# Patient Record
Sex: FEMALE | Race: WHITE | NOT HISPANIC OR LATINO | Employment: UNEMPLOYED | URBAN - NONMETROPOLITAN AREA
[De-identification: names, ages, dates, MRNs, and addresses within clinical notes are randomized per-mention and may not be internally consistent; named-entity substitution may affect disease eponyms.]

---

## 2023-10-15 ENCOUNTER — HOSPITAL ENCOUNTER (EMERGENCY)
Facility: HOSPITAL | Age: 73
Discharge: OTHER INSTITUTION | End: 2023-10-15
Attending: EMERGENCY MEDICINE
Payer: MEDICARE

## 2023-10-15 ENCOUNTER — APPOINTMENT (OUTPATIENT)
Dept: RADIOLOGY | Facility: HOSPITAL | Age: 73
End: 2023-10-15
Payer: MEDICARE

## 2023-10-15 VITALS
BODY MASS INDEX: 23.56 KG/M2 | WEIGHT: 120 LBS | SYSTOLIC BLOOD PRESSURE: 143 MMHG | DIASTOLIC BLOOD PRESSURE: 73 MMHG | HEIGHT: 60 IN | HEART RATE: 90 BPM | TEMPERATURE: 98 F | OXYGEN SATURATION: 92 % | RESPIRATION RATE: 16 BRPM

## 2023-10-15 DIAGNOSIS — S72.002A CLOSED FRACTURE OF LEFT HIP, INITIAL ENCOUNTER (MULTI): Primary | ICD-10-CM

## 2023-10-15 LAB
ANION GAP SERPL CALC-SCNC: 13 MMOL/L (ref 10–20)
APPEARANCE UR: CLEAR
BACTERIA #/AREA URNS AUTO: ABNORMAL /HPF
BASOPHILS # BLD AUTO: 0.03 X10*3/UL (ref 0–0.1)
BASOPHILS NFR BLD AUTO: 0.3 %
BILIRUB UR STRIP.AUTO-MCNC: NEGATIVE MG/DL
BUN SERPL-MCNC: 22 MG/DL (ref 6–23)
CALCIUM SERPL-MCNC: 9.3 MG/DL (ref 8.6–10.3)
CHLORIDE SERPL-SCNC: 105 MMOL/L (ref 98–107)
CK SERPL-CCNC: 138 U/L (ref 0–215)
CO2 SERPL-SCNC: 27 MMOL/L (ref 21–32)
COLOR UR: YELLOW
CREAT SERPL-MCNC: 0.55 MG/DL (ref 0.5–1.05)
EOSINOPHIL # BLD AUTO: 0.01 X10*3/UL (ref 0–0.4)
EOSINOPHIL NFR BLD AUTO: 0.1 %
ERYTHROCYTE [DISTWIDTH] IN BLOOD BY AUTOMATED COUNT: 13.3 % (ref 11.5–14.5)
GFR SERPL CREATININE-BSD FRML MDRD: >90 ML/MIN/1.73M*2
GLUCOSE SERPL-MCNC: 149 MG/DL (ref 74–99)
GLUCOSE UR STRIP.AUTO-MCNC: NEGATIVE MG/DL
HCT VFR BLD AUTO: 37.6 % (ref 36–46)
HGB BLD-MCNC: 12 G/DL (ref 12–16)
HOLD SPECIMEN: NORMAL
IMM GRANULOCYTES # BLD AUTO: 0.06 X10*3/UL (ref 0–0.5)
IMM GRANULOCYTES NFR BLD AUTO: 0.6 % (ref 0–0.9)
KETONES UR STRIP.AUTO-MCNC: ABNORMAL MG/DL
LEUKOCYTE ESTERASE UR QL STRIP.AUTO: ABNORMAL
LYMPHOCYTES # BLD AUTO: 0.7 X10*3/UL (ref 0.8–3)
LYMPHOCYTES NFR BLD AUTO: 6.9 %
MCH RBC QN AUTO: 32.8 PG (ref 26–34)
MCHC RBC AUTO-ENTMCNC: 31.9 G/DL (ref 32–36)
MCV RBC AUTO: 103 FL (ref 80–100)
MONOCYTES # BLD AUTO: 0.71 X10*3/UL (ref 0.05–0.8)
MONOCYTES NFR BLD AUTO: 7 %
NEUTROPHILS # BLD AUTO: 8.67 X10*3/UL (ref 1.6–5.5)
NEUTROPHILS NFR BLD AUTO: 85.1 %
NITRITE UR QL STRIP.AUTO: NEGATIVE
NRBC BLD-RTO: 0 /100 WBCS (ref 0–0)
PH UR STRIP.AUTO: 6 [PH]
PLATELET # BLD AUTO: 194 X10*3/UL (ref 150–450)
PMV BLD AUTO: 10 FL (ref 7.5–11.5)
POTASSIUM SERPL-SCNC: 3.7 MMOL/L (ref 3.5–5.3)
PROT UR STRIP.AUTO-MCNC: NEGATIVE MG/DL
RBC # BLD AUTO: 3.66 X10*6/UL (ref 4–5.2)
RBC # UR STRIP.AUTO: NEGATIVE /UL
RBC #/AREA URNS AUTO: ABNORMAL /HPF
SODIUM SERPL-SCNC: 141 MMOL/L (ref 136–145)
SP GR UR STRIP.AUTO: 1.05
SQUAMOUS #/AREA URNS AUTO: ABNORMAL /HPF
UROBILINOGEN UR STRIP.AUTO-MCNC: <2 MG/DL
WBC # BLD AUTO: 10.2 X10*3/UL (ref 4.4–11.3)
WBC #/AREA URNS AUTO: ABNORMAL /HPF

## 2023-10-15 PROCEDURE — 72125 CT NECK SPINE W/O DYE: CPT | Mod: ME

## 2023-10-15 PROCEDURE — 36415 COLL VENOUS BLD VENIPUNCTURE: CPT | Performed by: EMERGENCY MEDICINE

## 2023-10-15 PROCEDURE — 73130 X-RAY EXAM OF HAND: CPT | Mod: LT,FY

## 2023-10-15 PROCEDURE — 81003 URINALYSIS AUTO W/O SCOPE: CPT | Performed by: EMERGENCY MEDICINE

## 2023-10-15 PROCEDURE — 73090 X-RAY EXAM OF FOREARM: CPT | Mod: LT,FY

## 2023-10-15 PROCEDURE — 2500000001 HC RX 250 WO HCPCS SELF ADMINISTERED DRUGS (ALT 637 FOR MEDICARE OP): Performed by: EMERGENCY MEDICINE

## 2023-10-15 PROCEDURE — 72125 CT NECK SPINE W/O DYE: CPT | Performed by: RADIOLOGY

## 2023-10-15 PROCEDURE — 73110 X-RAY EXAM OF WRIST: CPT | Mod: LT,FY

## 2023-10-15 PROCEDURE — 82550 ASSAY OF CK (CPK): CPT | Performed by: EMERGENCY MEDICINE

## 2023-10-15 PROCEDURE — 74177 CT ABD & PELVIS W/CONTRAST: CPT | Mod: MG

## 2023-10-15 PROCEDURE — 73130 X-RAY EXAM OF HAND: CPT | Mod: LEFT SIDE | Performed by: RADIOLOGY

## 2023-10-15 PROCEDURE — 73090 X-RAY EXAM OF FOREARM: CPT | Mod: LEFT SIDE | Performed by: RADIOLOGY

## 2023-10-15 PROCEDURE — 2500000004 HC RX 250 GENERAL PHARMACY W/ HCPCS (ALT 636 FOR OP/ED): Performed by: EMERGENCY MEDICINE

## 2023-10-15 PROCEDURE — 2550000001 HC RX 255 CONTRASTS: Performed by: EMERGENCY MEDICINE

## 2023-10-15 PROCEDURE — 70450 CT HEAD/BRAIN W/O DYE: CPT | Mod: ME

## 2023-10-15 PROCEDURE — 71045 X-RAY EXAM CHEST 1 VIEW: CPT | Performed by: RADIOLOGY

## 2023-10-15 PROCEDURE — 80048 BASIC METABOLIC PNL TOTAL CA: CPT | Performed by: EMERGENCY MEDICINE

## 2023-10-15 PROCEDURE — 74177 CT ABD & PELVIS W/CONTRAST: CPT | Performed by: RADIOLOGY

## 2023-10-15 PROCEDURE — 85025 COMPLETE CBC W/AUTO DIFF WBC: CPT | Performed by: EMERGENCY MEDICINE

## 2023-10-15 PROCEDURE — 73110 X-RAY EXAM OF WRIST: CPT | Mod: LEFT SIDE | Performed by: RADIOLOGY

## 2023-10-15 PROCEDURE — 71045 X-RAY EXAM CHEST 1 VIEW: CPT | Mod: FY

## 2023-10-15 PROCEDURE — 99285 EMERGENCY DEPT VISIT HI MDM: CPT | Mod: 25

## 2023-10-15 PROCEDURE — 70450 CT HEAD/BRAIN W/O DYE: CPT | Performed by: RADIOLOGY

## 2023-10-15 RX ORDER — MORPHINE SULFATE 4 MG/ML
4 INJECTION, SOLUTION INTRAMUSCULAR; INTRAVENOUS ONCE
Status: COMPLETED | OUTPATIENT
Start: 2023-10-15 | End: 2023-10-15

## 2023-10-15 RX ORDER — DIVALPROEX SODIUM 250 MG/1
250 TABLET, FILM COATED, EXTENDED RELEASE ORAL DAILY
COMMUNITY
End: 2023-12-22 | Stop reason: ENTERED-IN-ERROR

## 2023-10-15 RX ORDER — BACITRACIN ZINC 500 UNIT/G
OINTMENT IN PACKET (EA) TOPICAL 3 TIMES DAILY
Status: DISCONTINUED | OUTPATIENT
Start: 2023-10-15 | End: 2023-10-15 | Stop reason: HOSPADM

## 2023-10-15 RX ORDER — MORPHINE SULFATE 2 MG/ML
2 INJECTION, SOLUTION INTRAMUSCULAR; INTRAVENOUS ONCE
Status: COMPLETED | OUTPATIENT
Start: 2023-10-15 | End: 2023-10-15

## 2023-10-15 RX ORDER — LOPERAMIDE HYDROCHLORIDE 2 MG/1
2 CAPSULE ORAL EVERY 6 HOURS PRN
COMMUNITY

## 2023-10-15 RX ORDER — MIRTAZAPINE 30 MG/1
30 TABLET, FILM COATED ORAL NIGHTLY
COMMUNITY

## 2023-10-15 RX ORDER — ONDANSETRON HYDROCHLORIDE 2 MG/ML
4 INJECTION, SOLUTION INTRAVENOUS ONCE
Status: COMPLETED | OUTPATIENT
Start: 2023-10-15 | End: 2023-10-15

## 2023-10-15 RX ORDER — CEPHRADINE 500 MG
1 CAPSULE ORAL DAILY
COMMUNITY
End: 2023-12-22 | Stop reason: ENTERED-IN-ERROR

## 2023-10-15 RX ORDER — ACETAMINOPHEN, DIPHENHYDRAMINE HCL, PHENYLEPHRINE HCL 325; 25; 5 MG/1; MG/1; MG/1
5000 TABLET ORAL DAILY
COMMUNITY
End: 2023-12-22 | Stop reason: ENTERED-IN-ERROR

## 2023-10-15 RX ORDER — ZINC GLUCONATE 50 MG
50 TABLET ORAL DAILY
COMMUNITY
End: 2023-12-22 | Stop reason: ENTERED-IN-ERROR

## 2023-10-15 RX ORDER — HYDROXYZINE HYDROCHLORIDE 25 MG/1
25 TABLET, FILM COATED ORAL EVERY 8 HOURS PRN
COMMUNITY

## 2023-10-15 RX ORDER — ASCORBIC ACID 250 MG
250 TABLET ORAL DAILY
COMMUNITY
End: 2023-12-22 | Stop reason: ENTERED-IN-ERROR

## 2023-10-15 RX ORDER — ACETAMINOPHEN 500 MG
1000 TABLET ORAL EVERY 6 HOURS PRN
COMMUNITY

## 2023-10-15 RX ADMIN — IOHEXOL 80 ML: 350 INJECTION, SOLUTION INTRAVENOUS at 09:50

## 2023-10-15 RX ADMIN — ONDANSETRON 4 MG: 2 INJECTION INTRAMUSCULAR; INTRAVENOUS at 10:22

## 2023-10-15 RX ADMIN — BACITRACIN ZINC 1 APPLICATION: 500 OINTMENT TOPICAL at 08:22

## 2023-10-15 RX ADMIN — MORPHINE SULFATE 4 MG: 4 INJECTION, SOLUTION INTRAMUSCULAR; INTRAVENOUS at 13:38

## 2023-10-15 RX ADMIN — MORPHINE SULFATE 2 MG: 2 INJECTION, SOLUTION INTRAMUSCULAR; INTRAVENOUS at 10:22

## 2023-10-15 ASSESSMENT — PAIN - FUNCTIONAL ASSESSMENT: PAIN_FUNCTIONAL_ASSESSMENT: 0-10

## 2023-10-15 ASSESSMENT — COLUMBIA-SUICIDE SEVERITY RATING SCALE - C-SSRS
6. HAVE YOU EVER DONE ANYTHING, STARTED TO DO ANYTHING, OR PREPARED TO DO ANYTHING TO END YOUR LIFE?: NO
2. HAVE YOU ACTUALLY HAD ANY THOUGHTS OF KILLING YOURSELF?: NO
1. IN THE PAST MONTH, HAVE YOU WISHED YOU WERE DEAD OR WISHED YOU COULD GO TO SLEEP AND NOT WAKE UP?: NO

## 2023-10-15 ASSESSMENT — PAIN SCALES - GENERAL
PAINLEVEL_OUTOF10: 9
PAINLEVEL_OUTOF10: 5 - MODERATE PAIN
PAINLEVEL_OUTOF10: 1
PAINLEVEL_OUTOF10: 3

## 2023-10-15 ASSESSMENT — PAIN DESCRIPTION - DESCRIPTORS: DESCRIPTORS: SHARP

## 2023-10-15 ASSESSMENT — PAIN DESCRIPTION - LOCATION: LOCATION: LEG

## 2023-10-15 ASSESSMENT — PAIN DESCRIPTION - PAIN TYPE: TYPE: ACUTE PAIN

## 2023-10-15 ASSESSMENT — PAIN DESCRIPTION - ORIENTATION: ORIENTATION: LEFT

## 2023-10-15 NOTE — ED PROVIDER NOTES
HPI   Chief Complaint   Patient presents with    Hip Pain     Patient sent from [facility] for unwitnessed fall, found on floor with left leg injury, shortened and externally rotated. Postive MSP. Pt c/o feeling lightheaded last night, does not remember what happened this morning. Found laying on living room floor.        Patient presents to the emergency department by squad after being found on the ground at her nursing home.  Apparently the patient was found on the ground by staff.  It is apparent that someone had to help the patient get up if she arrives in her day close and not her pajamas.  The patient is an overall poor historian with known dementia.  She is complaining of pain everywhere but she has a noted skin tear to the left forearm and a noted deformity of the left lower extremity.      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  Social History     Tobacco Use    Smoking status: Not on file    Smokeless tobacco: Not on file   Substance Use Topics    Alcohol use: Not on file    Drug use: Not on file       Physical Exam   ED Triage Vitals [10/15/23 0741]   Temp Heart Rate Resp BP   36.7 °C (98 °F) 96 20 145/69      SpO2 Temp Source Heart Rate Source Patient Position    97 % Temporal -- Lying      BP Location FiO2 (%)     Right arm --       Physical Exam  Vitals and nursing note reviewed.   Constitutional:       General: She is not in acute distress.     Appearance: She is not ill-appearing, toxic-appearing or diaphoretic.      Comments: Patient arrives immobilized on a pneumatic backboard.  She appears elderly, frail, and chronically debilitated.  She cannot answer any questions appropriately but she is awake and alert.   HENT:      Head: Normocephalic and atraumatic.      Right Ear: Tympanic membrane, ear canal and external ear normal. There is no impacted cerumen.      Left Ear: Tympanic membrane, ear canal and external ear normal. There is no impacted cerumen.      Ears:      Comments: No hemotympanum     Nose: Nose normal. No rhinorrhea.      Mouth/Throat:      Mouth: Mucous membranes are moist.      Pharynx: Oropharynx is clear.   Eyes:      Extraocular Movements: Extraocular movements intact.      Conjunctiva/sclera: Conjunctivae normal.      Pupils: Pupils are equal, round, and reactive to light.   Neck:      Comments: Trachea is midline  Cardiovascular:      Rate and Rhythm: Normal rate and regular rhythm.      Heart sounds: No murmur heard.  Pulmonary:      Effort: Pulmonary effort is normal.      Breath sounds: Normal breath sounds. No wheezing.   Abdominal:      General: Abdomen is flat. Bowel sounds are normal. There is no distension.      Palpations: Abdomen is soft.      Tenderness: There is no abdominal tenderness.   Musculoskeletal:         General: Tenderness present.      Cervical back: Normal range of motion.      Comments: Patient has shortening and external rotation of her left lower extremity with very limited range of motion.  Otherwise she has full range of motion in the other 3 extremities.   Skin:     General: Skin is warm and dry.      Coloration: Skin is pale.      Findings: No rash.      Comments: Patient has a skin tear of the volar aspect of the  mid shaft left forearm.  This measures approximate 9 cm in the long axis and 4 cm wide.  No active bleeding.  Nothing that would require suturing.   Neurological:      General: No focal deficit present.      Mental Status: She is alert and oriented to person, place, and time. Mental status is at baseline.   Psychiatric:      Comments: Patient has obvious dementia and cannot answer any questions appropriately         ED Course & MDM   Diagnoses as of 10/15/23 1142   Closed fracture of left hip, initial encounter (CMS/[facility])       Medical Decision Making  Later on in the encounter the patient's daughter is noted to be at bedside.  I explained the diagnosis of an intertrochanteric left hip fracture and explained that this would need to be treated operatively.  I spoke to Dr. Houston who is on-call for orthopedic services and he recommended transfer to [facility].  The patient's daughter is also requesting transfer to [facility] as this is where the patient's private physician and establish neurologist are located.  I spoke to Caty at the transfer center at [facility] and she gave me the name of Dr. Leiva as the excepting physician and agreed with transport ER to ER to their facility.  Patient will be transported by local squad in stable condition.        Procedure  Procedures     Gonzales Herzog, DO  10/15/23 1145

## 2023-10-22 ENCOUNTER — NURSING HOME VISIT (OUTPATIENT)
Dept: POST ACUTE CARE | Facility: EXTERNAL LOCATION | Age: 73
End: 2023-10-22
Payer: MEDICARE

## 2023-10-22 DIAGNOSIS — S72.92XD CLOSED FRACTURE OF LEFT FEMUR WITH ROUTINE HEALING, UNSPECIFIED FRACTURE MORPHOLOGY, UNSPECIFIED PORTION OF FEMUR, SUBSEQUENT ENCOUNTER: Primary | ICD-10-CM

## 2023-10-22 DIAGNOSIS — F03.B0 MODERATE DEMENTIA, UNSPECIFIED DEMENTIA TYPE, UNSPECIFIED WHETHER BEHAVIORAL, PSYCHOTIC, OR MOOD DISTURBANCE OR ANXIETY (MULTI): ICD-10-CM

## 2023-10-22 PROCEDURE — 99304 1ST NF CARE SF/LOW MDM 25: CPT | Performed by: INTERNAL MEDICINE

## 2023-10-22 NOTE — LETTER
Patient: Mya Daniels  : 1950    Encounter Date: 10/22/2023    Pt was seen in the NH.  71 YO F WITH PMH BIPOLAR DEMENTIA HTN HERE AFTER FALL AND LEFT FEMUR FRACTURE FOR REHAB , no complaint  General appearance: Comfortable, no distress  ROS: No SOB  Medications reviewed  Head: Normal  Neck: Soft  Heart: Regular  Lungs: Clear  Abdomen: soft  eXT LEFT LOWER EXT MILD EDEMA  Impression: REHAB SUPPORTIVE CARE, continue current management    Problem List Items Addressed This Visit    None  Visit Diagnoses       Closed fracture of left femur with routine healing, unspecified fracture morphology, unspecified portion of femur, subsequent encounter    -  Primary    Moderate dementia, unspecified dementia type, unspecified whether behavioral, psychotic, or mood disturbance or anxiety (CMS/[facility])                   Electronically Signed By: Josué Mlaloy MD   10/22/23  5:47 PM

## 2023-10-22 NOTE — PROGRESS NOTES
Pt was seen in the NH.  71 YO F WITH PMH BIPOLAR DEMENTIA HTN HERE AFTER FALL AND LEFT FEMUR FRACTURE FOR REHAB , no complaint  General appearance: Comfortable, no distress  ROS: No SOB  Medications reviewed  Head: Normal  Neck: Soft  Heart: Regular  Lungs: Clear  Abdomen: soft  eXT LEFT LOWER EXT MILD EDEMA  Impression: REHAB SUPPORTIVE CARE, continue current management    Problem List Items Addressed This Visit    None  Visit Diagnoses       Closed fracture of left femur with routine healing, unspecified fracture morphology, unspecified portion of femur, subsequent encounter    -  Primary    Moderate dementia, unspecified dementia type, unspecified whether behavioral, psychotic, or mood disturbance or anxiety (CMS/[facility])

## 2023-10-24 ENCOUNTER — LAB REQUISITION (OUTPATIENT)
Dept: LAB | Facility: HOSPITAL | Age: 73
End: 2023-10-24
Payer: MEDICARE

## 2023-10-24 DIAGNOSIS — F31.9 BIPOLAR DISORDER, UNSPECIFIED (MULTI): ICD-10-CM

## 2023-10-24 DIAGNOSIS — I10 ESSENTIAL (PRIMARY) HYPERTENSION: ICD-10-CM

## 2023-10-24 LAB
ALBUMIN SERPL BCP-MCNC: 3.2 G/DL (ref 3.4–5)
ALP SERPL-CCNC: 64 U/L (ref 33–136)
ALT SERPL W P-5'-P-CCNC: 9 U/L (ref 7–45)
ANION GAP SERPL CALC-SCNC: 10 MMOL/L (ref 10–20)
AST SERPL W P-5'-P-CCNC: 17 U/L (ref 9–39)
BILIRUB SERPL-MCNC: 0.8 MG/DL (ref 0–1.2)
BUN SERPL-MCNC: 13 MG/DL (ref 6–23)
CALCIUM SERPL-MCNC: 8.5 MG/DL (ref 8.6–10.3)
CHLORIDE SERPL-SCNC: 105 MMOL/L (ref 98–107)
CO2 SERPL-SCNC: 32 MMOL/L (ref 21–32)
CREAT SERPL-MCNC: 0.48 MG/DL (ref 0.5–1.05)
ERYTHROCYTE [DISTWIDTH] IN BLOOD BY AUTOMATED COUNT: 15.9 % (ref 11.5–14.5)
GFR SERPL CREATININE-BSD FRML MDRD: >90 ML/MIN/1.73M*2
GLUCOSE SERPL-MCNC: 85 MG/DL (ref 74–99)
HCT VFR BLD AUTO: 25.4 % (ref 36–46)
HGB BLD-MCNC: 7.9 G/DL (ref 12–16)
MCH RBC QN AUTO: 33.5 PG (ref 26–34)
MCHC RBC AUTO-ENTMCNC: 31.1 G/DL (ref 32–36)
MCV RBC AUTO: 108 FL (ref 80–100)
NRBC BLD-RTO: 0 /100 WBCS (ref 0–0)
PLATELET # BLD AUTO: 336 X10*3/UL (ref 150–450)
PMV BLD AUTO: 9.2 FL (ref 7.5–11.5)
POTASSIUM SERPL-SCNC: 4.6 MMOL/L (ref 3.5–5.3)
PROT SERPL-MCNC: 4.9 G/DL (ref 6.4–8.2)
RBC # BLD AUTO: 2.36 X10*6/UL (ref 4–5.2)
SODIUM SERPL-SCNC: 142 MMOL/L (ref 136–145)
TSH SERPL-ACNC: 6.03 MIU/L (ref 0.44–3.98)
WBC # BLD AUTO: 4.3 X10*3/UL (ref 4.4–11.3)

## 2023-10-24 PROCEDURE — 85027 COMPLETE CBC AUTOMATED: CPT

## 2023-10-24 PROCEDURE — 80053 COMPREHEN METABOLIC PANEL: CPT

## 2023-10-24 PROCEDURE — 84443 ASSAY THYROID STIM HORMONE: CPT

## 2023-10-25 ENCOUNTER — LAB REQUISITION (OUTPATIENT)
Dept: LAB | Facility: HOSPITAL | Age: 73
End: 2023-10-25
Payer: MEDICARE

## 2023-10-25 DIAGNOSIS — I10 ESSENTIAL (PRIMARY) HYPERTENSION: ICD-10-CM

## 2023-10-25 DIAGNOSIS — F31.9 BIPOLAR DISORDER, UNSPECIFIED (MULTI): ICD-10-CM

## 2023-10-25 LAB
ALBUMIN SERPL BCP-MCNC: 3.2 G/DL (ref 3.4–5)
ALP SERPL-CCNC: 69 U/L (ref 33–136)
ALT SERPL W P-5'-P-CCNC: 9 U/L (ref 7–45)
ANION GAP SERPL CALC-SCNC: 10 MMOL/L (ref 10–20)
AST SERPL W P-5'-P-CCNC: 17 U/L (ref 9–39)
BILIRUB SERPL-MCNC: 0.7 MG/DL (ref 0–1.2)
BUN SERPL-MCNC: 12 MG/DL (ref 6–23)
CALCIUM SERPL-MCNC: 8.4 MG/DL (ref 8.6–10.3)
CHLORIDE SERPL-SCNC: 106 MMOL/L (ref 98–107)
CO2 SERPL-SCNC: 29 MMOL/L (ref 21–32)
CREAT SERPL-MCNC: 0.53 MG/DL (ref 0.5–1.05)
ERYTHROCYTE [DISTWIDTH] IN BLOOD BY AUTOMATED COUNT: 16.2 % (ref 11.5–14.5)
GFR SERPL CREATININE-BSD FRML MDRD: >90 ML/MIN/1.73M*2
GLUCOSE SERPL-MCNC: 93 MG/DL (ref 74–99)
HCT VFR BLD AUTO: 26.9 % (ref 36–46)
HGB BLD-MCNC: 8 G/DL (ref 12–16)
MCH RBC QN AUTO: 32.3 PG (ref 26–34)
MCHC RBC AUTO-ENTMCNC: 29.7 G/DL (ref 32–36)
MCV RBC AUTO: 109 FL (ref 80–100)
NRBC BLD-RTO: 0 /100 WBCS (ref 0–0)
PLATELET # BLD AUTO: 350 X10*3/UL (ref 150–450)
PMV BLD AUTO: 9 FL (ref 7.5–11.5)
POTASSIUM SERPL-SCNC: 4.4 MMOL/L (ref 3.5–5.3)
PROT SERPL-MCNC: 4.9 G/DL (ref 6.4–8.2)
RBC # BLD AUTO: 2.48 X10*6/UL (ref 4–5.2)
SODIUM SERPL-SCNC: 141 MMOL/L (ref 136–145)
TSH SERPL-ACNC: 7.65 MIU/L (ref 0.44–3.98)
WBC # BLD AUTO: 4.2 X10*3/UL (ref 4.4–11.3)

## 2023-10-25 PROCEDURE — 85027 COMPLETE CBC AUTOMATED: CPT

## 2023-10-25 PROCEDURE — 84443 ASSAY THYROID STIM HORMONE: CPT

## 2023-10-25 PROCEDURE — 80053 COMPREHEN METABOLIC PANEL: CPT

## 2023-10-29 ENCOUNTER — NURSING HOME VISIT (OUTPATIENT)
Dept: POST ACUTE CARE | Facility: EXTERNAL LOCATION | Age: 73
End: 2023-10-29
Payer: MEDICARE

## 2023-10-29 DIAGNOSIS — R60.0 BILATERAL LOWER EXTREMITY EDEMA: Primary | ICD-10-CM

## 2023-10-29 DIAGNOSIS — F03.B0 MODERATE DEMENTIA, UNSPECIFIED DEMENTIA TYPE, UNSPECIFIED WHETHER BEHAVIORAL, PSYCHOTIC, OR MOOD DISTURBANCE OR ANXIETY (MULTI): ICD-10-CM

## 2023-10-29 PROCEDURE — 99308 SBSQ NF CARE LOW MDM 20: CPT | Performed by: INTERNAL MEDICINE

## 2023-10-29 NOTE — LETTER
Patient: Mya Daniels  : 1950    Encounter Date: 10/29/2023    Pt was seen in the NH.  Co LE edema  General appearance: Comfortable, no distress  ROS: No SOB  Medications reviewed  Head: Normal  Neck: Soft  Heart: Regular  Lungs: Clear  Abdomen: soft  Ext moderate LE edema  Impression: clinically doing fine, continue current management  Dc amlodipie , monitor BP  Problem List Items Addressed This Visit    None  Visit Diagnoses       Bilateral lower extremity edema    -  Primary    Moderate dementia, unspecified dementia type, unspecified whether behavioral, psychotic, or mood disturbance or anxiety (CMS/[facility])                   Electronically Signed By: Josué Malloy MD   10/29/23  9:17 PM

## 2023-10-30 NOTE — PROGRESS NOTES
Pt was seen in the NH.  Co LE edema  General appearance: Comfortable, no distress  ROS: No SOB  Medications reviewed  Head: Normal  Neck: Soft  Heart: Regular  Lungs: Clear  Abdomen: soft  Ext moderate LE edema  Impression: clinically doing fine, continue current management  Dc amlodipie , monitor BP  Problem List Items Addressed This Visit    None  Visit Diagnoses       Bilateral lower extremity edema    -  Primary    Moderate dementia, unspecified dementia type, unspecified whether behavioral, psychotic, or mood disturbance or anxiety (CMS/[facility])

## 2023-10-31 ENCOUNTER — LAB REQUISITION (OUTPATIENT)
Dept: LAB | Facility: HOSPITAL | Age: 73
End: 2023-10-31
Payer: MEDICARE

## 2023-10-31 DIAGNOSIS — F03.90 UNSPECIFIED DEMENTIA, UNSPECIFIED SEVERITY, WITHOUT BEHAVIORAL DISTURBANCE, PSYCHOTIC DISTURBANCE, MOOD DISTURBANCE, AND ANXIETY (MULTI): ICD-10-CM

## 2023-10-31 DIAGNOSIS — I10 ESSENTIAL (PRIMARY) HYPERTENSION: ICD-10-CM

## 2023-10-31 LAB
ERYTHROCYTE [DISTWIDTH] IN BLOOD BY AUTOMATED COUNT: 15.8 % (ref 11.5–14.5)
FOLATE SERPL-MCNC: 11.4 NG/ML
HCT VFR BLD AUTO: 30.3 % (ref 36–46)
HGB BLD-MCNC: 9.2 G/DL (ref 12–16)
IRON SATN MFR SERPL: 14 % (ref 25–45)
IRON SERPL-MCNC: 32 UG/DL (ref 35–150)
MCH RBC QN AUTO: 33 PG (ref 26–34)
MCHC RBC AUTO-ENTMCNC: 30.4 G/DL (ref 32–36)
MCV RBC AUTO: 109 FL (ref 80–100)
NRBC BLD-RTO: 0 /100 WBCS (ref 0–0)
PLATELET # BLD AUTO: 380 X10*3/UL (ref 150–450)
PMV BLD AUTO: 9.1 FL (ref 7.5–11.5)
RBC # BLD AUTO: 2.79 X10*6/UL (ref 4–5.2)
TIBC SERPL-MCNC: 235 UG/DL (ref 240–445)
UIBC SERPL-MCNC: 203 UG/DL (ref 110–370)
WBC # BLD AUTO: 6.1 X10*3/UL (ref 4.4–11.3)

## 2023-10-31 PROCEDURE — 85027 COMPLETE CBC AUTOMATED: CPT

## 2023-10-31 PROCEDURE — 83540 ASSAY OF IRON: CPT

## 2023-10-31 PROCEDURE — 82746 ASSAY OF FOLIC ACID SERUM: CPT

## 2023-10-31 PROCEDURE — 83550 IRON BINDING TEST: CPT

## 2023-11-01 ENCOUNTER — LAB REQUISITION (OUTPATIENT)
Dept: LAB | Facility: HOSPITAL | Age: 73
End: 2023-11-01
Payer: MEDICARE

## 2023-11-01 DIAGNOSIS — I10 ESSENTIAL (PRIMARY) HYPERTENSION: ICD-10-CM

## 2023-11-01 DIAGNOSIS — F03.90 UNSPECIFIED DEMENTIA, UNSPECIFIED SEVERITY, WITHOUT BEHAVIORAL DISTURBANCE, PSYCHOTIC DISTURBANCE, MOOD DISTURBANCE, AND ANXIETY (MULTI): ICD-10-CM

## 2023-11-01 LAB
ERYTHROCYTE [DISTWIDTH] IN BLOOD BY AUTOMATED COUNT: 15.4 % (ref 11.5–14.5)
FOLATE SERPL-MCNC: 11.5 NG/ML
HCT VFR BLD AUTO: 30.1 % (ref 36–46)
HGB BLD-MCNC: 9.5 G/DL (ref 12–16)
IRON SATN MFR SERPL: 14 % (ref 25–45)
IRON SERPL-MCNC: 32 UG/DL (ref 35–150)
MCH RBC QN AUTO: 33.8 PG (ref 26–34)
MCHC RBC AUTO-ENTMCNC: 31.6 G/DL (ref 32–36)
MCV RBC AUTO: 107 FL (ref 80–100)
NRBC BLD-RTO: 0 /100 WBCS (ref 0–0)
PLATELET # BLD AUTO: 325 X10*3/UL (ref 150–450)
PMV BLD AUTO: 8.8 FL (ref 7.5–11.5)
RBC # BLD AUTO: 2.81 X10*6/UL (ref 4–5.2)
TIBC SERPL-MCNC: 236 UG/DL (ref 240–445)
TSH SERPL-ACNC: 1.22 MIU/L (ref 0.44–3.98)
UIBC SERPL-MCNC: 204 UG/DL (ref 110–370)
WBC # BLD AUTO: 5 X10*3/UL (ref 4.4–11.3)

## 2023-11-01 PROCEDURE — 83550 IRON BINDING TEST: CPT

## 2023-11-01 PROCEDURE — 82746 ASSAY OF FOLIC ACID SERUM: CPT

## 2023-11-01 PROCEDURE — 83540 ASSAY OF IRON: CPT

## 2023-11-01 PROCEDURE — 84443 ASSAY THYROID STIM HORMONE: CPT

## 2023-11-01 PROCEDURE — 85027 COMPLETE CBC AUTOMATED: CPT

## 2023-11-07 ENCOUNTER — LAB REQUISITION (OUTPATIENT)
Dept: LAB | Facility: HOSPITAL | Age: 73
End: 2023-11-07
Payer: MEDICARE

## 2023-11-07 DIAGNOSIS — L03.90 CELLULITIS, UNSPECIFIED: ICD-10-CM

## 2023-11-07 LAB
ANION GAP SERPL CALC-SCNC: 10 MMOL/L (ref 10–20)
BUN SERPL-MCNC: 15 MG/DL (ref 6–23)
CALCIUM SERPL-MCNC: 8.6 MG/DL (ref 8.6–10.3)
CHLORIDE SERPL-SCNC: 105 MMOL/L (ref 98–107)
CO2 SERPL-SCNC: 32 MMOL/L (ref 21–32)
CREAT SERPL-MCNC: 0.6 MG/DL (ref 0.5–1.05)
GFR SERPL CREATININE-BSD FRML MDRD: >90 ML/MIN/1.73M*2
GLUCOSE SERPL-MCNC: 75 MG/DL (ref 74–99)
POTASSIUM SERPL-SCNC: 4.4 MMOL/L (ref 3.5–5.3)
SODIUM SERPL-SCNC: 143 MMOL/L (ref 136–145)

## 2023-11-07 PROCEDURE — 80048 BASIC METABOLIC PNL TOTAL CA: CPT

## 2023-11-18 ENCOUNTER — NURSING HOME VISIT (OUTPATIENT)
Dept: POST ACUTE CARE | Facility: EXTERNAL LOCATION | Age: 73
End: 2023-11-18
Payer: MEDICARE

## 2023-11-18 DIAGNOSIS — S72.92XD CLOSED FRACTURE OF LEFT FEMUR WITH ROUTINE HEALING, UNSPECIFIED FRACTURE MORPHOLOGY, UNSPECIFIED PORTION OF FEMUR, SUBSEQUENT ENCOUNTER: ICD-10-CM

## 2023-11-18 DIAGNOSIS — F03.B0 MODERATE DEMENTIA, UNSPECIFIED DEMENTIA TYPE, UNSPECIFIED WHETHER BEHAVIORAL, PSYCHOTIC, OR MOOD DISTURBANCE OR ANXIETY (MULTI): Primary | ICD-10-CM

## 2023-11-18 PROCEDURE — 99308 SBSQ NF CARE LOW MDM 20: CPT | Performed by: INTERNAL MEDICINE

## 2023-11-18 NOTE — LETTER
Patient: Mya Daniels  : 1950    Encounter Date: 2023    Pt was seen in the NH.  Pt is in usual state , no complaint  General appearance: Comfortable, no distress  ROS: No SOB  Medications reviewed  Head: Normal  Neck: Soft  Heart: Regular  Lungs: Clear  Abdomen: soft    Impression: clinically doing fine, continue current management    Problem List Items Addressed This Visit    None  Visit Diagnoses       Moderate dementia, unspecified dementia type, unspecified whether behavioral, psychotic, or mood disturbance or anxiety (CMS/[facility])    -  Primary    Closed fracture of left femur with routine healing, unspecified fracture morphology, unspecified portion of femur, subsequent encounter                   Electronically Signed By: Josué Malloy MD   23  8:03 PM

## 2023-11-19 NOTE — PROGRESS NOTES
Pt was seen in the NH.  Pt is in usual state , no complaint  General appearance: Comfortable, no distress  ROS: No SOB  Medications reviewed  Head: Normal  Neck: Soft  Heart: Regular  Lungs: Clear  Abdomen: soft    Impression: clinically doing fine, continue current management    Problem List Items Addressed This Visit    None  Visit Diagnoses       Moderate dementia, unspecified dementia type, unspecified whether behavioral, psychotic, or mood disturbance or anxiety (CMS/[facility])    -  Primary    Closed fracture of left femur with routine healing, unspecified fracture morphology, unspecified portion of femur, subsequent encounter

## 2023-11-22 ENCOUNTER — LAB REQUISITION (OUTPATIENT)
Dept: LAB | Facility: HOSPITAL | Age: 73
End: 2023-11-22
Payer: MEDICARE

## 2023-11-22 DIAGNOSIS — F31.9 BIPOLAR DISORDER, UNSPECIFIED (MULTI): ICD-10-CM

## 2023-11-22 DIAGNOSIS — F03.90 UNSPECIFIED DEMENTIA, UNSPECIFIED SEVERITY, WITHOUT BEHAVIORAL DISTURBANCE, PSYCHOTIC DISTURBANCE, MOOD DISTURBANCE, AND ANXIETY (MULTI): ICD-10-CM

## 2023-11-22 DIAGNOSIS — S72.145D NONDISPLACED INTERTROCHANTERIC FRACTURE OF LEFT FEMUR, SUBSEQUENT ENCOUNTER FOR CLOSED FRACTURE WITH ROUTINE HEALING: ICD-10-CM

## 2023-11-22 LAB
BASOPHILS # BLD AUTO: 0.03 X10*3/UL (ref 0–0.1)
BASOPHILS NFR BLD AUTO: 0.5 %
EOSINOPHIL # BLD AUTO: 0.12 X10*3/UL (ref 0–0.4)
EOSINOPHIL NFR BLD AUTO: 2.1 %
ERYTHROCYTE [DISTWIDTH] IN BLOOD BY AUTOMATED COUNT: 13.5 % (ref 11.5–14.5)
FOLATE SERPL-MCNC: 9.1 NG/ML
HCT VFR BLD AUTO: 37.5 % (ref 36–46)
HGB BLD-MCNC: 11.5 G/DL (ref 12–16)
IMM GRANULOCYTES # BLD AUTO: 0.02 X10*3/UL (ref 0–0.5)
IMM GRANULOCYTES NFR BLD AUTO: 0.4 % (ref 0–0.9)
LYMPHOCYTES # BLD AUTO: 1.48 X10*3/UL (ref 0.8–3)
LYMPHOCYTES NFR BLD AUTO: 26.1 %
MCH RBC QN AUTO: 32.8 PG (ref 26–34)
MCHC RBC AUTO-ENTMCNC: 30.7 G/DL (ref 32–36)
MCV RBC AUTO: 107 FL (ref 80–100)
MONOCYTES # BLD AUTO: 0.57 X10*3/UL (ref 0.05–0.8)
MONOCYTES NFR BLD AUTO: 10 %
NEUTROPHILS # BLD AUTO: 3.46 X10*3/UL (ref 1.6–5.5)
NEUTROPHILS NFR BLD AUTO: 60.9 %
NRBC BLD-RTO: 0 /100 WBCS (ref 0–0)
PLATELET # BLD AUTO: 257 X10*3/UL (ref 150–450)
RBC # BLD AUTO: 3.51 X10*6/UL (ref 4–5.2)
WBC # BLD AUTO: 5.7 X10*3/UL (ref 4.4–11.3)

## 2023-11-22 PROCEDURE — 85025 COMPLETE CBC W/AUTO DIFF WBC: CPT

## 2023-11-22 PROCEDURE — 82746 ASSAY OF FOLIC ACID SERUM: CPT

## 2023-11-29 ENCOUNTER — APPOINTMENT (OUTPATIENT)
Dept: RADIOLOGY | Facility: HOSPITAL | Age: 73
End: 2023-11-29
Payer: MEDICARE

## 2023-11-29 ENCOUNTER — LAB REQUISITION (OUTPATIENT)
Dept: LAB | Facility: HOSPITAL | Age: 73
End: 2023-11-29
Payer: MEDICARE

## 2023-11-29 ENCOUNTER — HOSPITAL ENCOUNTER (EMERGENCY)
Facility: HOSPITAL | Age: 73
Discharge: NURSING, REHAB OR LONG-TERM CARE | End: 2023-11-29
Attending: EMERGENCY MEDICINE
Payer: MEDICARE

## 2023-11-29 VITALS
HEART RATE: 78 BPM | TEMPERATURE: 98.2 F | RESPIRATION RATE: 18 BRPM | SYSTOLIC BLOOD PRESSURE: 126 MMHG | WEIGHT: 116 LBS | DIASTOLIC BLOOD PRESSURE: 89 MMHG | HEIGHT: 60 IN | BODY MASS INDEX: 22.78 KG/M2 | OXYGEN SATURATION: 95 %
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
## 2023-11-29 DIAGNOSIS — Z51.81 ENCOUNTER FOR THERAPEUTIC DRUG LEVEL MONITORING: ICD-10-CM

## 2023-11-29 PROCEDURE — 99283 EMERGENCY DEPT VISIT LOW MDM: CPT | Mod: 25 | Performed by: EMERGENCY MEDICINE

## 2023-11-29 PROCEDURE — 73523 X-RAY EXAM HIPS BI 5/> VIEWS: CPT

## 2023-11-29 PROCEDURE — 73523 X-RAY EXAM HIPS BI 5/> VIEWS: CPT | Mod: BILATERAL PROCEDURE | Performed by: STUDENT IN AN ORGANIZED HEALTH CARE EDUCATION/TRAINING PROGRAM

## 2023-11-29 ASSESSMENT — PAIN SCALES - PAIN ASSESSMENT IN ADVANCED DEMENTIA (PAINAD)
BODYLANGUAGE: RELAXED
FACIALEXPRESSION: SMILING OR INEXPRESSIVE
CONSOLABILITY: NO NEED TO CONSOLE
BREATHING: NORMAL
TOTALSCORE: 0

## 2023-11-29 ASSESSMENT — COLUMBIA-SUICIDE SEVERITY RATING SCALE - C-SSRS
1. IN THE PAST MONTH, HAVE YOU WISHED YOU WERE DEAD OR WISHED YOU COULD GO TO SLEEP AND NOT WAKE UP?: NO
2. HAVE YOU ACTUALLY HAD ANY THOUGHTS OF KILLING YOURSELF?: NO
6. HAVE YOU EVER DONE ANYTHING, STARTED TO DO ANYTHING, OR PREPARED TO DO ANYTHING TO END YOUR LIFE?: NO

## 2023-11-29 ASSESSMENT — PAIN - FUNCTIONAL ASSESSMENT: PAIN_FUNCTIONAL_ASSESSMENT: PAINAD (PAIN ASSESSMENT IN ADVANCED DEMENTIA SCALE)

## 2023-11-29 NOTE — ED PROVIDER NOTES
HPI   No chief complaint on file.      Limitations to History: Alzheimer's    HPI: 73-year-old female presents with fall from local nursing facility.  Patient fell out of her chair.  Initial concern for right hip pain.  Patient is complaining of no pain at this time.    Additional History Obtained from: EMS    ------------------------------------------------------------------------------------------------------------------------------------------  Physical Exam:    VS: As documented in the triage note and EMR flowsheet from this visit were reviewed.    Appearance: Alert. cooperative,  in no acute distress.   Skin: Intact,  dry skin, no lesions, rash, petechiae or purpura.   Eyes: PERRLA, EOMs intact,  Conjunctiva pink with no redness or exudates.   HENT: Normocephalic, atraumatic. Nares patent. No intraoral lesions.   Neck: Supple, without meningismus. Trachea at midline. No lymphadenopathy.  Pulmonary: Clear bilaterally with good chest wall excursion. No rales, rhonchi or wheezing. No accessory muscle use or stridor.  Cardiac: Regular rate and rhythm, no rubs, murmurs, or gallops.   Abdomen: Abdomen is soft, nontender, and nondistended.  No palpable organomegaly.  No rebound or guarding.  No CVA tenderness. Nonsurgical abdomen  Genitourinary: Exam deferred.  Musculoskeletal: Full range of motion.  Pulses full and equal. No cyanosis, clubbing, or edema.  Neurological:  Cranial nerves are grossly intact, grossly normal sensation, no weakness, no focal findings identified.  Psychiatric: Appropriate mood and affect.                            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  Social History     Tobacco Use   • Smoking status: Former     Types: Cigarettes   • Smokeless tobacco: Never   Substance Use Topics   • Alcohol use: Not Currently   • Drug use: Never       Physical Exam   ED Triage Vitals [11/29/23 0301]   Temp Heart Rate Resp BP   36.8 °C (98.2 °F) 87 18 (!) 144/91      SpO2 Temp Source Heart Rate Source Patient Position   93 % Temporal Monitor --      BP Location FiO2 (%)     -- --       Physical Exam    ED Course & MDM   Diagnoses as of 11/29/23 0401   Fall, initial encounter       Medical Decision Making  XR hips bilateral 5+ VW w pelvis when performed   Final Result    No acute osseous abnormality of the pelvis or proximal right or left    femur.          Partial interval healing of subacute left intertrochanteric femur    fracture status post intramedullary femoral nail.          MACRO:    None.          Signed by: Abran Flannery 11/29/2023 3:56 AM    Dictation workstation:   [id number]     Medical Decision Making:    Patient appears well and nontoxic.  History limited secondary to the patient's Alzheimer's.  Bilateral hip x-ray performed without evidence of acute fracture or dislocation.  Patient has no evidence of head injury.  Will be discharged back to skilled nursing facility.  Stable at time of discharge.    Differential Diagnoses Considered: Hip fracture versus contusion versus dislocation    Independent Interpretation of Studies:  I independently interpreted: Bilateral hip x-ray shows no evidence of fracture or dislocation.    Escalation of Care:  Appropriate for  discharge and follow-up with primary care.          Procedure  Procedures     Mateusz Garcia,   11/29/23 2077

## 2023-12-01 ENCOUNTER — LAB REQUISITION (OUTPATIENT)
Dept: LAB | Facility: HOSPITAL | Age: 73
End: 2023-12-01
Payer: MEDICARE

## 2023-12-01 DIAGNOSIS — Z51.81 ENCOUNTER FOR THERAPEUTIC DRUG LEVEL MONITORING: ICD-10-CM

## 2023-12-01 LAB — VALPROATE SERPL-MCNC: 89 UG/ML (ref 50–100)

## 2023-12-01 PROCEDURE — 80164 ASSAY DIPROPYLACETIC ACD TOT: CPT

## 2023-12-02 PROCEDURE — 81001 URINALYSIS AUTO W/SCOPE: CPT

## 2023-12-02 PROCEDURE — 87086 URINE CULTURE/COLONY COUNT: CPT

## 2023-12-02 PROCEDURE — 87186 SC STD MICRODIL/AGAR DIL: CPT

## 2023-12-03 ENCOUNTER — LAB REQUISITION (OUTPATIENT)
Dept: LAB | Facility: HOSPITAL | Age: 73
End: 2023-12-03
Payer: MEDICARE

## 2023-12-03 DIAGNOSIS — R41.82 ALTERED MENTAL STATUS, UNSPECIFIED: ICD-10-CM

## 2023-12-03 LAB
APPEARANCE UR: ABNORMAL
BACTERIA #/AREA URNS AUTO: ABNORMAL /HPF
BILIRUB UR STRIP.AUTO-MCNC: NEGATIVE MG/DL
CAOX CRY #/AREA UR COMP ASSIST: ABNORMAL /HPF
COLOR UR: YELLOW
GLUCOSE UR STRIP.AUTO-MCNC: NEGATIVE MG/DL
KETONES UR STRIP.AUTO-MCNC: ABNORMAL MG/DL
LEUKOCYTE ESTERASE UR QL STRIP.AUTO: ABNORMAL
MUCOUS THREADS #/AREA URNS AUTO: ABNORMAL /LPF
NITRITE UR QL STRIP.AUTO: POSITIVE
PH UR STRIP.AUTO: 5 [PH]
PROT UR STRIP.AUTO-MCNC: ABNORMAL MG/DL
RBC # UR STRIP.AUTO: NEGATIVE /UL
RBC #/AREA URNS AUTO: >20 /HPF
SP GR UR STRIP.AUTO: 1.02
UROBILINOGEN UR STRIP.AUTO-MCNC: <2 MG/DL
WBC #/AREA URNS AUTO: >50 /HPF
WBC CLUMPS #/AREA URNS AUTO: ABNORMAL /HPF

## 2023-12-05 ENCOUNTER — LAB REQUISITION (OUTPATIENT)
Dept: LAB | Facility: HOSPITAL | Age: 73
End: 2023-12-05
Payer: MEDICARE

## 2023-12-05 DIAGNOSIS — Z51.81 ENCOUNTER FOR THERAPEUTIC DRUG LEVEL MONITORING: ICD-10-CM

## 2023-12-05 LAB
BACTERIA UR CULT: ABNORMAL
TSH SERPL-ACNC: 1.63 MIU/L (ref 0.44–3.98)

## 2023-12-05 PROCEDURE — 84443 ASSAY THYROID STIM HORMONE: CPT

## 2023-12-08 ENCOUNTER — LAB REQUISITION (OUTPATIENT)
Dept: LAB | Facility: HOSPITAL | Age: 73
End: 2023-12-08
Payer: MEDICARE

## 2023-12-08 DIAGNOSIS — R41.0 DISORIENTATION, UNSPECIFIED: ICD-10-CM

## 2023-12-08 DIAGNOSIS — Z51.81 ENCOUNTER FOR THERAPEUTIC DRUG LEVEL MONITORING: ICD-10-CM

## 2023-12-08 LAB
ALBUMIN SERPL BCP-MCNC: 3.4 G/DL (ref 3.4–5)
ALP SERPL-CCNC: 94 U/L (ref 33–136)
ALT SERPL W P-5'-P-CCNC: 5 U/L (ref 7–45)
AMMONIA PLAS-SCNC: 18 UMOL/L (ref 16–53)
ANION GAP SERPL CALC-SCNC: 10 MMOL/L (ref 10–20)
AST SERPL W P-5'-P-CCNC: 13 U/L (ref 9–39)
BILIRUB SERPL-MCNC: 0.3 MG/DL (ref 0–1.2)
BUN SERPL-MCNC: 14 MG/DL (ref 6–23)
CALCIUM SERPL-MCNC: 9.1 MG/DL (ref 8.6–10.3)
CHLORIDE SERPL-SCNC: 104 MMOL/L (ref 98–107)
CO2 SERPL-SCNC: 32 MMOL/L (ref 21–32)
CREAT SERPL-MCNC: 0.64 MG/DL (ref 0.5–1.05)
ERYTHROCYTE [DISTWIDTH] IN BLOOD BY AUTOMATED COUNT: 13.3 % (ref 11.5–14.5)
GFR SERPL CREATININE-BSD FRML MDRD: >90 ML/MIN/1.73M*2
GLUCOSE SERPL-MCNC: 95 MG/DL (ref 74–99)
HCT VFR BLD AUTO: 37.3 % (ref 36–46)
HGB BLD-MCNC: 11.9 G/DL (ref 12–16)
MCH RBC QN AUTO: 32.6 PG (ref 26–34)
MCHC RBC AUTO-ENTMCNC: 31.9 G/DL (ref 32–36)
MCV RBC AUTO: 102 FL (ref 80–100)
NRBC BLD-RTO: 0 /100 WBCS (ref 0–0)
PLATELET # BLD AUTO: 208 X10*3/UL (ref 150–450)
POTASSIUM SERPL-SCNC: 4.5 MMOL/L (ref 3.5–5.3)
PROT SERPL-MCNC: 5.5 G/DL (ref 6.4–8.2)
RBC # BLD AUTO: 3.65 X10*6/UL (ref 4–5.2)
SODIUM SERPL-SCNC: 141 MMOL/L (ref 136–145)
WBC # BLD AUTO: 5.2 X10*3/UL (ref 4.4–11.3)

## 2023-12-08 PROCEDURE — 80053 COMPREHEN METABOLIC PANEL: CPT

## 2023-12-08 PROCEDURE — 82140 ASSAY OF AMMONIA: CPT

## 2023-12-08 PROCEDURE — 85027 COMPLETE CBC AUTOMATED: CPT

## 2023-12-22 ENCOUNTER — HOSPITAL ENCOUNTER (EMERGENCY)
Facility: HOSPITAL | Age: 73
Discharge: HOME | End: 2023-12-22
Attending: EMERGENCY MEDICINE
Payer: MEDICARE

## 2023-12-22 ENCOUNTER — APPOINTMENT (OUTPATIENT)
Dept: CARDIOLOGY | Facility: HOSPITAL | Age: 73
End: 2023-12-22
Payer: MEDICARE

## 2023-12-22 ENCOUNTER — APPOINTMENT (OUTPATIENT)
Dept: RADIOLOGY | Facility: HOSPITAL | Age: 73
End: 2023-12-22
Payer: MEDICARE

## 2023-12-22 VITALS
HEART RATE: 81 BPM | OXYGEN SATURATION: 100 % | WEIGHT: 102 LBS | HEIGHT: 62 IN | BODY MASS INDEX: 18.77 KG/M2 | SYSTOLIC BLOOD PRESSURE: 145 MMHG | TEMPERATURE: 98.4 F | DIASTOLIC BLOOD PRESSURE: 76 MMHG | RESPIRATION RATE: 17 BRPM

## 2023-12-22 DIAGNOSIS — R60.9 PERIPHERAL EDEMA: Primary | ICD-10-CM

## 2023-12-22 LAB
ANION GAP SERPL CALC-SCNC: 12 MMOL/L (ref 10–20)
BASOPHILS # BLD AUTO: 0.03 X10*3/UL (ref 0–0.1)
BASOPHILS NFR BLD AUTO: 0.4 %
BUN SERPL-MCNC: 15 MG/DL (ref 6–23)
CALCIUM SERPL-MCNC: 9 MG/DL (ref 8.6–10.3)
CHLORIDE SERPL-SCNC: 104 MMOL/L (ref 98–107)
CK SERPL-CCNC: 37 U/L (ref 0–215)
CO2 SERPL-SCNC: 29 MMOL/L (ref 21–32)
CREAT SERPL-MCNC: 0.59 MG/DL (ref 0.5–1.05)
EOSINOPHIL # BLD AUTO: 0.06 X10*3/UL (ref 0–0.4)
EOSINOPHIL NFR BLD AUTO: 0.8 %
ERYTHROCYTE [DISTWIDTH] IN BLOOD BY AUTOMATED COUNT: 13.1 % (ref 11.5–14.5)
GFR SERPL CREATININE-BSD FRML MDRD: >90 ML/MIN/1.73M*2
GLUCOSE SERPL-MCNC: 131 MG/DL (ref 74–99)
HCT VFR BLD AUTO: 39.2 % (ref 36–46)
HGB BLD-MCNC: 12.6 G/DL (ref 12–16)
IMM GRANULOCYTES # BLD AUTO: 0.03 X10*3/UL (ref 0–0.5)
IMM GRANULOCYTES NFR BLD AUTO: 0.4 % (ref 0–0.9)
LACTATE SERPL-SCNC: 1.1 MMOL/L (ref 0.4–2)
LYMPHOCYTES # BLD AUTO: 1.22 X10*3/UL (ref 0.8–3)
LYMPHOCYTES NFR BLD AUTO: 15.6 %
MCH RBC QN AUTO: 32.1 PG (ref 26–34)
MCHC RBC AUTO-ENTMCNC: 32.1 G/DL (ref 32–36)
MCV RBC AUTO: 100 FL (ref 80–100)
MONOCYTES # BLD AUTO: 0.67 X10*3/UL (ref 0.05–0.8)
MONOCYTES NFR BLD AUTO: 8.6 %
NEUTROPHILS # BLD AUTO: 5.79 X10*3/UL (ref 1.6–5.5)
NEUTROPHILS NFR BLD AUTO: 74.2 %
NRBC BLD-RTO: 0 /100 WBCS (ref 0–0)
PLATELET # BLD AUTO: 228 X10*3/UL (ref 150–450)
POTASSIUM SERPL-SCNC: 3.8 MMOL/L (ref 3.5–5.3)
RBC # BLD AUTO: 3.93 X10*6/UL (ref 4–5.2)
SODIUM SERPL-SCNC: 141 MMOL/L (ref 136–145)
WBC # BLD AUTO: 7.8 X10*3/UL (ref 4.4–11.3)

## 2023-12-22 PROCEDURE — 85025 COMPLETE CBC W/AUTO DIFF WBC: CPT | Performed by: EMERGENCY MEDICINE

## 2023-12-22 PROCEDURE — 2550000001 HC RX 255 CONTRASTS: Performed by: EMERGENCY MEDICINE

## 2023-12-22 PROCEDURE — 75635 CT ANGIO ABDOMINAL ARTERIES: CPT

## 2023-12-22 PROCEDURE — 82550 ASSAY OF CK (CPK): CPT | Performed by: EMERGENCY MEDICINE

## 2023-12-22 PROCEDURE — 99284 EMERGENCY DEPT VISIT MOD MDM: CPT | Performed by: EMERGENCY MEDICINE

## 2023-12-22 PROCEDURE — 80048 BASIC METABOLIC PNL TOTAL CA: CPT | Performed by: EMERGENCY MEDICINE

## 2023-12-22 PROCEDURE — 93005 ELECTROCARDIOGRAM TRACING: CPT

## 2023-12-22 PROCEDURE — 75635 CT ANGIO ABDOMINAL ARTERIES: CPT | Performed by: RADIOLOGY

## 2023-12-22 PROCEDURE — 36415 COLL VENOUS BLD VENIPUNCTURE: CPT | Performed by: EMERGENCY MEDICINE

## 2023-12-22 PROCEDURE — 99285 EMERGENCY DEPT VISIT HI MDM: CPT | Mod: 25

## 2023-12-22 PROCEDURE — 83605 ASSAY OF LACTIC ACID: CPT | Performed by: EMERGENCY MEDICINE

## 2023-12-22 RX ORDER — SENNOSIDES 8.6 MG/1
1-2 TABLET ORAL EVERY 12 HOURS PRN
COMMUNITY

## 2023-12-22 RX ORDER — ACETAMINOPHEN 325 MG/1
650 TABLET ORAL EVERY 4 HOURS PRN
COMMUNITY

## 2023-12-22 RX ORDER — NAPROXEN SODIUM 220 MG/1
81 TABLET, FILM COATED ORAL 2 TIMES DAILY
COMMUNITY

## 2023-12-22 RX ORDER — LEVOTHYROXINE SODIUM 50 UG/1
50 TABLET ORAL
COMMUNITY

## 2023-12-22 RX ORDER — TALC
3 POWDER (GRAM) TOPICAL NIGHTLY
COMMUNITY

## 2023-12-22 RX ORDER — ADHESIVE BANDAGE
30 BANDAGE TOPICAL DAILY PRN
COMMUNITY

## 2023-12-22 RX ORDER — DONEPEZIL HYDROCHLORIDE 5 MG/1
5 TABLET, FILM COATED ORAL NIGHTLY
COMMUNITY

## 2023-12-22 RX ORDER — DIVALPROEX SODIUM 125 MG/1
5 CAPSULE, COATED PELLETS ORAL NIGHTLY
COMMUNITY

## 2023-12-22 RX ADMIN — IOHEXOL 68 ML: 350 INJECTION, SOLUTION INTRAVENOUS at 15:58

## 2023-12-22 ASSESSMENT — PAIN DESCRIPTION - LOCATION: LOCATION: LEG

## 2023-12-22 ASSESSMENT — PAIN - FUNCTIONAL ASSESSMENT
PAIN_FUNCTIONAL_ASSESSMENT: 0-10
PAIN_FUNCTIONAL_ASSESSMENT: WONG-BAKER FACES

## 2023-12-22 ASSESSMENT — PAIN SCALES - GENERAL
PAINLEVEL_OUTOF10: 0 - NO PAIN
PAINLEVEL_OUTOF10: 0 - NO PAIN

## 2023-12-22 ASSESSMENT — PAIN DESCRIPTION - ORIENTATION: ORIENTATION: LEFT;RIGHT

## 2023-12-22 ASSESSMENT — PAIN SCALES - WONG BAKER: WONGBAKER_NUMERICALRESPONSE: HURTS LITTLE BIT

## 2023-12-24 LAB
ATRIAL RATE: 93 BPM
P AXIS: 51 DEGREES
P OFFSET: 205 MS
P ONSET: 145 MS
PR INTERVAL: 148 MS
Q ONSET: 219 MS
QRS COUNT: 16 BEATS
QRS DURATION: 66 MS
QT INTERVAL: 344 MS
QTC CALCULATION(BAZETT): 427 MS
QTC FREDERICIA: 398 MS
R AXIS: 18 DEGREES
T AXIS: 63 DEGREES
T OFFSET: 391 MS
VENTRICULAR RATE: 93 BPM

## 2024-05-29 ENCOUNTER — LAB REQUISITION (OUTPATIENT)
Dept: LAB | Facility: HOSPITAL | Age: 74
End: 2024-05-29
Payer: MEDICARE

## 2024-05-29 DIAGNOSIS — Z51.81 ENCOUNTER FOR THERAPEUTIC DRUG LEVEL MONITORING: ICD-10-CM

## 2024-05-29 LAB
ALBUMIN SERPL BCP-MCNC: 3.8 G/DL (ref 3.4–5)
ALP SERPL-CCNC: 59 U/L (ref 33–136)
ALT SERPL W P-5'-P-CCNC: 9 U/L (ref 7–45)
ANION GAP SERPL CALC-SCNC: 13 MMOL/L (ref 10–20)
AST SERPL W P-5'-P-CCNC: 19 U/L (ref 9–39)
BASOPHILS # BLD AUTO: 0.04 X10*3/UL (ref 0–0.1)
BASOPHILS NFR BLD AUTO: 0.8 %
BILIRUB SERPL-MCNC: 0.4 MG/DL (ref 0–1.2)
BUN SERPL-MCNC: 20 MG/DL (ref 6–23)
CALCIUM SERPL-MCNC: 9 MG/DL (ref 8.6–10.3)
CHLORIDE SERPL-SCNC: 107 MMOL/L (ref 98–107)
CO2 SERPL-SCNC: 27 MMOL/L (ref 21–32)
CREAT SERPL-MCNC: 0.49 MG/DL (ref 0.5–1.05)
EGFRCR SERPLBLD CKD-EPI 2021: >90 ML/MIN/1.73M*2
EOSINOPHIL # BLD AUTO: 0.17 X10*3/UL (ref 0–0.4)
EOSINOPHIL NFR BLD AUTO: 3.4 %
ERYTHROCYTE [DISTWIDTH] IN BLOOD BY AUTOMATED COUNT: 12.6 % (ref 11.5–14.5)
GLUCOSE SERPL-MCNC: 65 MG/DL (ref 74–99)
HCT VFR BLD AUTO: 41.7 % (ref 36–46)
HGB BLD-MCNC: 13.1 G/DL (ref 12–16)
IMM GRANULOCYTES # BLD AUTO: 0.02 X10*3/UL (ref 0–0.5)
IMM GRANULOCYTES NFR BLD AUTO: 0.4 % (ref 0–0.9)
LYMPHOCYTES # BLD AUTO: 2.3 X10*3/UL (ref 0.8–3)
LYMPHOCYTES NFR BLD AUTO: 46.4 %
MCH RBC QN AUTO: 32.4 PG (ref 26–34)
MCHC RBC AUTO-ENTMCNC: 31.4 G/DL (ref 32–36)
MCV RBC AUTO: 103 FL (ref 80–100)
MONOCYTES # BLD AUTO: 0.48 X10*3/UL (ref 0.05–0.8)
MONOCYTES NFR BLD AUTO: 9.7 %
NEUTROPHILS # BLD AUTO: 1.95 X10*3/UL (ref 1.6–5.5)
NEUTROPHILS NFR BLD AUTO: 39.3 %
NRBC BLD-RTO: 0 /100 WBCS (ref 0–0)
PLATELET # BLD AUTO: 178 X10*3/UL (ref 150–450)
POTASSIUM SERPL-SCNC: 4.4 MMOL/L (ref 3.5–5.3)
PROT SERPL-MCNC: 5.9 G/DL (ref 6.4–8.2)
RBC # BLD AUTO: 4.04 X10*6/UL (ref 4–5.2)
SODIUM SERPL-SCNC: 143 MMOL/L (ref 136–145)
VALPROATE SERPL-MCNC: 84 UG/ML (ref 50–100)
WBC # BLD AUTO: 5 X10*3/UL (ref 4.4–11.3)

## 2024-05-29 PROCEDURE — 80053 COMPREHEN METABOLIC PANEL: CPT

## 2024-05-29 PROCEDURE — 80164 ASSAY DIPROPYLACETIC ACD TOT: CPT

## 2024-05-29 PROCEDURE — 85025 COMPLETE CBC W/AUTO DIFF WBC: CPT

## 2024-06-04 VITALS
HEART RATE: 79 BPM | TEMPERATURE: 97.7 F | DIASTOLIC BLOOD PRESSURE: 57 MMHG | RESPIRATION RATE: 18 BRPM | SYSTOLIC BLOOD PRESSURE: 148 MMHG

## 2024-06-04 NOTE — HP.PCM_ITS
History of Present Illness    
Date of Service: 24    
Chief Complaint: Ulceration of the right posterior calf    
History of Wound: This is a 73-year-old female who is a resident of [facility].  She suffers from bipolar disorder, depression, and   
dementia.  She is a poor historian.  She presented with an ulceration on the   
right posterior calf, time indeterminate.  The patient is known to be a   
  .  She has been advised in the past to wear compression stockings, but   
she refuses, and is not compliant.  She has recently been prescribed cefadroxil   
500 mg p.o. twice daily, for a 10-day course.  This was prescribed as a result   
of cultures of her ulceration which were performed on May 24, 2024, which   
revealed the presence of Staph aureus.  It is said that the patient suffers from  
swelling and edema in her lower extremities.  She sleeps on a flat mattress at   
night.  However, she is not very active, and spends a good deal of each day   
sitting.  She requires a walker for ambulation.  She has a history of   
hypertension and hypothyroidism.  She otherwise denies a history of myocardial   
infarction, cerebrovascular accident, diabetes mellitus, renal disease,   
pulmonary disease, and hyperlipidemia.  The patient is of relatively normal body  
habitus, with a BMI of 19.5.     
     
    
    
[facility]    
Medical History (Reviewed 24 @ 16:11 by Dr. Evangelista Carvajal MD)    
    
Non-pressure chronic ulcer of calf    
Wound infection    
Leg edema    
Leg swelling    
Hypothyroidism    
Hypertension    
Urinary incontinence    
Dementia    
Depression    
Bipolar disorder    
Non-pressure chronic ulcer of lower leg    
    
    
                                Home Medications    
    
    
    
?Medication ?Instructions ?Recorded ?Last Taken ?Type    
     
Depakote 625 cap PO QHS 24 Unknown History    
     
aspirin 81 mg capsule 81 mg PO DAILY 24 Unknown History    
     
cholecalciferol (vitamin D3) 10 25 mcg PO DAILY 24 Unknown History    
    
mcg/mL (400 unit/mL) oral drops        
    
(Pedia D-Konstantin)        
     
donepezil 10 mg tablet 5 mg PO DAILY 24 Unknown History    
     
donepezil 5 mg tablet (Aricept) 5 mg PO QHS 24 Unknown History    
     
hydroxyzine HCl 25 mg tablet 25 mg PO Q8H PRN anxiety 24 Unknown History    
     
levothyroxine 50 mcg tablet 50 mcg PO DAILY 24 Unknown History    
     
loperamide 2 mg capsule 2 mg PO Q6H PRN loose stool 24 Unknown History    
     
melatonin 3 mg capsule 3 mg PO QHS 24 Unknown History    
     
mirtazapine 30 mg tablet 45 mg PO QHS 24 Unknown History    
     
mirtazapine 45 mg tablet 45 mg PO QHS 24 Unknown History    
    
    
    
                                            
    
    
    
Allergy/AdvReac Type Severity Reaction Status Date / Time    
     
carbamazepine Allergy Intermediate PT UNSURE Verified 24 13:16    
    
   OF REACTION      
    
    
    
Surgical History (Reviewed 24 @ 16:11 by Dr. Evangelista Carvajal MD)    
    
History of hip surgery    
    
    
Social History (Reviewed 24 @ 16:11 by Dr. Evangelista Carvajal MD)    
Smoking Status:  Former smoker     
    
    
    
Vital Signs    
Vital Signs    
Vital Signs:     
                                            
    
    
    
 24    
13:20    
     
Temperature 97.7 F L    
     
Temperature Source Temporal    
     
Pulse Rate 79    
     
Respiratory Rate 18    
     
Blood Pressure 148/57 H    
     
Blood Pressure Mean 87    
     
Blood Pressure Source Monitor    
     
Blood Pressure Position Semi-Fowlers    
     
Blood Pressure Location Left Arm    
     
Oxygen Delivery Method Room Air    
    
    
                                     Weight    
    
    
    
Weight:                        107 lb                                             
    
     
Body Mass Index (BMI)          19.5                                               
    
    
    
    
    
    
Physical Exam    
Const    
alert, no apparent distress, average body habitus and well nourished    
Constitutional Narrative:     
The patient is alert and interactive, though confused and unable to state her   
age and responds inappropriately to routine questions.  The patient is of   
relatively normal body habitus, with a BMI of 19.5.    
General Appearance: cooperative, comfortable, well kempt and well developed    
Orientation / Consciousness: awake, confused and disoriented    
HEENT    
normocephalic and head/scalp atraumatic    
Head and Scalp: normal to inspection, normocephalic and atraumatic    
Face and Sinus: normal facial exam    
External Ear: external ears normal    
Eyes    
EOMs intact bilaterally    
General Eye: normal appearance of both eyes    
Resp    
normal respiratory effort, normal air movement, no retractions and no use of   
accessory muscles    
Effort and Inspection: able to speak in complete sentences    
Extremity    
no calf tenderness    
General Extremity: Negative for clubbing or cyanosis    
Skin    
Wound Narrative:     
An ulceration is noted on the patient's right posterior calf.  It is covered by   
eschar and bioburden.  Dimensions are documented elsewhere.  There is no sign of  
infection or cellulitis.  There is no significant swelling or edema noted in the  
patient's lower extremities at this time.  There is a hint of inflammatory   
erythema in the gaiter area of both lower extremities.    
Neuro    
CN's II-XII intact bilaterally, moves all extremities and no focal motor   
deficits    
Sensorium / Orientation: awake, alert, orientation impaired and confused    
Psych    
Appearance: grossly normal and appropriate    
Attitude: calm    
Activity / Motor Behavior: appropriate eye contact    
Speech: normal speech    
Mood & Affect: euthymic mood    
Thought Process: normal thought process    
Thought Content: normal thought content    
Attention / Concentration: attention grossly intact    
    
Debridement Note    
Debridement Note    
Wound debrided: Right posterior calf    
Laterality: Right    
Type of Debridement: Excisional debridement    
Anesthesia Used: 5% Lidocaine Gel    
Depth: Down to and including healthy tissue and in the subcutaneous layer    
Percentage of wound debrided: 100    
Instrument Used: 5mm curette    
Tissue Removed: Eschar and bioburden    
Severity: Fat Layer Exposed    
Amount of bleeding with debridement: Mild    
Bleeding Controlled with: Compression and gauze    
Patient tolerated procedure: Patient tolerated procedure well    
Post-Debridement Measurements and Additional Note:     
Post-Debridement Measurements/Treatment    
    
 - Nurse 1 - General Ulcer Assessment               Start:  24 13:20    
Freq:                                                 Status: Active            
Protocol:  .MIO                                                            
    
    
Activity Type Activity Date Activity User E-sign Co-sign Detail    
    
Recorded Client Recorded Date Recorded By      
     
Document 24 13:20 KW       
    
wound center 24 13:41 KW       
    
    
    
    
  24    
    
  13:20    
     
 - Today's Visit Information     
     
Type of service Initial Visit    
     
Arrival Mode Ambulatory,    
    
 Walker    
     
Accompanied by CARE GIVER    
     
Patient Identification Verified (Name & Yes    
    
)     
     
Height and Weight     
     
Height 5 ft 2 in    
     
Weight 107 lb    
     
Weight in Pounds 107.0 lbs    
     
Body Mass Index (BMI) 19.5    
     
BMI Classification Normal    
     
BSA - Darrell 1.47    
     
Vital Signs     
     
Temperature (97.8 F-99.1 F) 97.7 F L    
     
Temperature Source Temporal    
     
Pulse Rate () 79    
     
Pulse Location Monitor    
     
Respiratory Rate (12-18) 18    
     
Respiratory rate source Observation    
     
Oxygen Delivery Method Room Air    
     
Blood Pressure (90//80) 148/57 H    
     
Blood Pressure Mean 87    
     
Source Monitor    
     
Position Semi-Fowlers    
     
Blood Pressure Location Left Arm    
     
History Since Last Visit- (Skip if this     
    
is Patient's initial visit)     
     
Left Footwear Regular Shoe    
     
Right Footwear Regular Shoe    
     
Pain Scale: 0-10 Numeric     
     
Is Patient Pain Free? Yes    
     
Lower Extremity Assessment/ Foot     
    
Assessment/ Toe Nail Assessment     
     
Right     
     
 -Posterior Tibial Doppler Multiphasic    
     
 -Dorsalis Pedis Doppler Multiphasic    
     
 -Extremity Color Red    
     
 -Hair Growth on Legs Yes    
     
 -Hair Growth on Toes No    
     
 -Temperature of Extremity Warm    
     
 -Capillary Refill Less than 3    
    
 Seconds    
     
 -Thick No    
     
 -Discolored No    
     
 -Deformed No    
     
 -Improper Length & Hygeine No    
     
Left     
     
 -Posterior Tibial Doppler Multiphasic    
     
 -Dorsalis Pedis Doppler Monophasic    
     
 -Hair Growth on Legs Yes    
     
 -Hair Growth on Toes No    
     
 -Temperature of Extremity Cool    
     
 -Thick No    
     
 -Discolored No    
     
 -Deformed No    
     
 -Improper Length & Hygeine No    
     
Communication Assessment     
     
Preferred language English    
     
 Required No    
     
Able to Read Yes    
     
Able to Write Yes    
     
Communication Tools None    
     
Caregiver Communication Skills No Impairment    
    
Impairment     
     
Right Hearing Abillity Normal    
     
Left Hearing Abillity Normal    
     
Visual Assistive Devices Glasses    
     
Teaching Assessment     
     
Preferences Verbal,Written,    
    
 Demonstration    
     
Barriers to Learning None    
     
Readiness To Learn Excellent    
     
Willingness to Engage in Self Management High    
    
Activies     
     
Readiness to Engage in Self Management High    
    
Activities     
     
Anxiety Level Calm    
     
Cooperation Cooperative    
     
Perception Coherent    
     
Interest in Health Problem Asks Questions    
     
Education Importance Acknowledges    
    
 Need    
     
Does Patient Smoke tobacco or other Yes    
    
substances     
     
Smoking Status Former smoker    
     
Is Patient Diabetic No    
     
Functional Assessment     
     
Recent Decline in Ability to Perform Ambulation,    
    
 Toileting    
     
Culture/Religion/     
     
Cultural/Religion Needs that may affect No    
    
Treatment Plan     
     
Would you allow our hospital  to No    
    
meet you for the purpose of spiritual/     
    
emotional support?     
     
 to contact place of [facility] No    
    
    
WC - Nurse 1 - General Ulcer Measurement              Start:  24 13:20    
Freq:                                                 Status: Active            
Protocol:                                                                       
    
    
Activity Type Activity Date Activity User E-sign Co-sign Detail    
    
Recorded Client Recorded Date Recorded By      
     
Document 24 13:20 KW       
    
wound center 24 13:41 KW       
    
    
    
    
  24    
    
  13:20    
     
Wound Center Nurse 1     
     
#1 RT POST LE     
     
 -Current Size (cm) - Length 1.2    
     
 -Current Size (cm) - Width 0.7    
     
 -Current Size (cm) - Depth 0.1    
     
 -Total Square Cm 0.84    
     
 -Date of Last Picture (Recall this 24    
    
field)     
     
 -Exudate Amt Small    
     
 -Exudate Type Serosanguineous    
     
 -Wound Margin Distinct,    
    
 Outline    
    
 Attached    
     
 -Granulation Amt Small (1-33%)    
     
 -Granulation Quality Red    
     
 -Necrosis Amt Large (%)    
     
 -Necrotic Tissue Type Adherent Slough    
     
 -Texture (Natalie-wound Skin Appearance) Assessed    
     
 -Moisture (Natalie-wound Skin Appearance) Assessed    
     
 -Color (Natalie-wound Skin Appearance) Assessed,    
    
 Erythema    
     
 -Temperature (Natalie-wound Skin No Abnormality    
    
Appearance) (Pt Warm)    
     
 -Tenderness on Palpation (Natalie-wound No    
    
Skin Appearance)     
     
 -Ulcer Cleansing Rinsed/    
    
 Irrigated with    
    
 Saline    
     
 -Foul Odor after Cleansing No    
     
 -Anesthetic Used 5% Lidocaine    
    
 Gel    
     
Right Calf (cm) 31.6    
     
Right Ankle (cm) 21.4    
     
Left Calf (cm) 30.6    
     
Left Ankle (cm) 20.2    
    
    
WC - Nurse 2 - General Ulcer CM Notes                 Start:  24 13:20    
Freq:                                                 Status: Active            
Protocol:                                                                       
    
    
Activity Type Activity Date Activity User E-sign Co-sign Detail    
    
Recorded Client Recorded Date Recorded By      
     
Document 24 14:18 DS       
    
[id number] 24 14:22 DS       
    
    
    
    
  24    
    
  14:18    
     
Wound Center Nurse 2     
     
#1 RT POST LE     
     
 -Time 14:15    
     
 -Correct Patient Yes    
     
 -Correct Side, Site, Position Yes    
     
 -Correct Procedure Yes    
     
 -Procedure Performed Yes    
     
 -Type of Procedure Debridement    
     
 -Clinical Debridement Subcutaneous    
     
 -Tissue Removed Subcutaneous    
     
 -Post Debridement (cm) - Length 1.4    
     
 -Post Debridement (cm) - Width 1.5    
     
 -Post Debridement (cm) - Depth 0.1    
     
 -Total Square (Post) (cm) 2.10    
     
 -Area of Debridement (cm) - Length 1.4    
     
 -Area of Debridement (cm) - Width 1.5    
     
 -Total Square (Area) (cm) 2.10    
     
 -Tunneling No    
     
 -Undermining/Tunneling No    
     
 -Circular Undermining No    
     
 -Wound/Ulcer Outcome Not Healed    
     
 -Ulcer Cleansing Rinsed/    
    
 Irrigated with    
    
 Saline    
     
 -Bleeding Controlled with Pressure    
     
 -Treatment Response Procedure    
    
 Tolerated Well    
     
 -Debridement - Subq, 1st 20sq cm Yes    
     
Pain Scale: 0-10 Numeric     
     
Is Patient Pain Free? Yes    
    
    
    
    
    
Assessment/Plan    
Assessment/Plan    
(1) Non-pressure chronic ulcer of calf:     
CODE(S):       L97.209 - Non-pressure chronic ulcer of unspecified calf with   
unspecified severity    
QUALIFIERS:               Laterality: right  Non-pressure ulcer stage: with fat   
layer exposed  Qualified Code(s): L97.212 - Non-pressure chronic ulcer of right   
calf with fat layer exposed    
(2) Non-pressure chronic ulcer of lower leg:     
CODE(S):       L97.909 - Non-pressure chronic ulcer of unspecified part of   
unspecified lower leg with unspecified severity    
QUALIFIERS:               Laterality: right  Non-pressure ulcer stage: with fat   
layer exposed  Qualified Code(s): L97.912 - Non-pressure chronic ulcer of   
unspecified part of right lower leg with fat layer exposed    
(3) Wound infection:     
CODE(S):       T14.8XXA - Other injury of unspecified body region, initial   
encounter; L08.9 - Local infection of the skin and subcutaneous tissue,   
unspecified    
(4) Leg swelling:     
CODE(S):       M79.89 - Other specified soft tissue disorders    
(5) Leg edema:     
CODE(S):       R60.0 - Localized edema    
(6) Bipolar disorder:     
CODE(S):       F31.9 - Bipolar disorder, unspecified    
(7) Depression:     
CODE(S):       F32.A - Depression, unspecified    
(8) Dementia:     
CODE(S):       F03.90 - Unspecified dementia, unspecified severity, without   
behavioral disturbance, psychotic disturbance, mood disturbance, and anxiety    
(9) Urinary incontinence:     
CODE(S):       R32 - Unspecified urinary incontinence    
(10) Hypertension:     
CODE(S):       I10 - Essential (primary) hypertension    
(11) Hypothyroidism:     
CODE(S):       E03.9 - Hypothyroidism, unspecified    
(12) History of hip surgery:     
CODE(S):       Z98.890 - Other specified postprocedural states    
PLAN:     
Plan    
This is a 73-year-old female who presented with an ulceration on the right   
posterior calf, age-indeterminate.  The patient is a poor historian.  She   
suffers from dementia, bipolar disorder, and depression.  The etiology of the   
patient's ulceration is uncertain.  Following debridement, the ulceration   
appears generally healthy and well-vascularized.  There is no sign of infection   
or cellulitis at this time.  We are to implement the use of moistened Miryam,   
which is to be applied today the patient's right lower extremity is to be   
wrapped with a 3M 2 layer compression wrap, which will stay in place undisturbed  
for several days.  This is intended to discourage the patient from  picking or   
disturbing the ulcer site.  She is to return in approximately 3 days for   
reapplication of the ulcer dressing and the 3M 2 layer compression wrap.    
Patient is to return in 1 week for reassessment.    
    
Total time: 48 minutes

## 2024-06-04 NOTE — PCM.WC.HP
History of Present Illness
Date of Service: 24
Chief Complaint: Ulceration of the right posterior calf
History of Wound: This is a 73-year-old female who is a resident of [facility].  She suffers from bipolar disorder, depression, and dementia.  She is a poor historian.  She presented with an ulceration on the right posterior 
calf, time indeterminate.  The patient is known to be a   .  She has been advised in the past to wear compression stockings, but she refuses, and is not compliant.  She has recently been prescribed cefadroxil 500 mg p.o. twice daily, for a 
10-day course.  This was prescribed as a result of cultures of her ulceration which were performed on May 24, 2024, which revealed the presence of Staph aureus.  It is said that the patient suffers from swelling and edema in her lower extremities.  
She sleeps on a flat mattress at night.  However, she is not very active, and spends a good deal of each day sitting.  She requires a walker for ambulation.  She has a history of hypertension and hypothyroidism.  She otherwise denies a history of 
myocardial infarction, cerebrovascular accident, diabetes mellitus, renal disease, pulmonary disease, and hyperlipidemia.  The patient is of relatively normal body habitus, with a BMI of 19.5. 
 


[facility] / [facility]
Medical History (Reviewed 24 @ 16:11 by Dr. Evangelista Carvajal MD)

Non-pressure chronic ulcer of calf
Wound infection
Leg edema
Leg swelling
Hypothyroidism
Hypertension
Urinary incontinence
Dementia
Depression
Bipolar disorder
Non-pressure chronic ulcer of lower leg


Home Medications

?Medication ?Instructions ?Recorded ?Last Taken ?Type
Depakote 625 cap PO QHS 24 Unknown History
aspirin 81 mg capsule 81 mg PO DAILY 24 Unknown History
cholecalciferol (vitamin D3) 10 25 mcg PO DAILY 24 Unknown History
mcg/mL (400 unit/mL) oral drops    
(Pedia D-Konstantin)    
donepezil 10 mg tablet 5 mg PO DAILY 24 Unknown History
donepezil 5 mg tablet (Aricept) 5 mg PO QHS 24 Unknown History
hydroxyzine HCl 25 mg tablet 25 mg PO Q8H PRN anxiety 24 Unknown History
levothyroxine 50 mcg tablet 50 mcg PO DAILY 24 Unknown History
loperamide 2 mg capsule 2 mg PO Q6H PRN loose stool 24 Unknown History
melatonin 3 mg capsule 3 mg PO QHS 24 Unknown History
mirtazapine 30 mg tablet 45 mg PO QHS 24 Unknown History
mirtazapine 45 mg tablet 45 mg PO QHS 24 Unknown History




Allergy/AdvReac Type Severity Reaction Status Date / Time
carbamazepine Allergy Intermediate PT UNSURE Verified 24 13:16
   OF REACTION  


Surgical History (Reviewed 24 @ 16:11 by Dr. Evangelista Carvajal MD)

History of hip surgery


Social History (Reviewed 24 @ 16:11 by Dr. Evangelista Carvajal MD)
Smoking Status:  Former smoker 



Vital Signs
Vital Signs
Vital Signs: 


 24
13:20
Temperature 97.7 F L
Temperature Source Temporal
Pulse Rate 79
Respiratory Rate 18
Blood Pressure 148/57 H
Blood Pressure Mean 87
Blood Pressure Source Monitor
Blood Pressure Position Semi-Fowlers
Blood Pressure Location Left Arm
Oxygen Delivery Method Room Air

Weight

Weight:                        107 lb                                            
Body Mass Index (BMI)          19.5                                              




Physical Exam
Const
alert, no apparent distress, average body habitus and well nourished
Constitutional Narrative: 
The patient is alert and interactive, though confused and unable to state her age and responds inappropriately to routine questions.  The patient is of relatively normal body habitus, with a BMI of 19.5.
General Appearance: cooperative, comfortable, well kempt and well developed
Orientation / Consciousness: awake, confused and disoriented
HEENT
normocephalic and head/scalp atraumatic
Head and Scalp: normal to inspection, normocephalic and atraumatic
Face and Sinus: normal facial exam
External Ear: external ears normal
Eyes
EOMs intact bilaterally
General Eye: normal appearance of both eyes
Resp
normal respiratory effort, normal air movement, no retractions and no use of accessory muscles
Effort and Inspection: able to speak in complete sentences
Extremity
no calf tenderness
General Extremity: Negative for clubbing or cyanosis
Skin
Wound Narrative: 
An ulceration is noted on the patient's right posterior calf.  It is covered by eschar and bioburden.  Dimensions are documented elsewhere.  There is no sign of infection or cellulitis.  There is no significant swelling or edema noted in the 
patient's lower extremities at this time.  There is a hint of inflammatory erythema in the gaiter area of both lower extremities.
Neuro
CN's II-XII intact bilaterally, moves all extremities and no focal motor deficits
Sensorium / Orientation: awake, alert, orientation impaired and confused
Psych
Appearance: grossly normal and appropriate
Attitude: calm
Activity / Motor Behavior: appropriate eye contact
Speech: normal speech
Mood & Affect: euthymic mood
Thought Process: normal thought process
Thought Content: normal thought content
Attention / Concentration: attention grossly intact

Debridement Note
Debridement Note
Wound debrided: Right posterior calf
Laterality: Right
Type of Debridement: Excisional debridement
Anesthesia Used: 5% Lidocaine Gel
Depth: Down to and including healthy tissue and in the subcutaneous layer
Percentage of wound debrided: 100
Instrument Used: 5mm curette
Tissue Removed: Eschar and bioburden
Severity: Fat Layer Exposed
Amount of bleeding with debridement: Mild
Bleeding Controlled with: Compression and gauze
Patient tolerated procedure: Patient tolerated procedure well
Post-Debridement Measurements and Additional Note: 
Post-Debridement Measurements/Treatment

 - Nurse 1 - General Ulcer Assessment               Start:  24 13:20
Freq:                                                 Status: Active        
Protocol:  .MIO                                                        
Activity Type Activity Date Activity User E-sign Co-sign Detail
Recorded Client Recorded Date Recorded By   
Document 24 13:20 KW   
wound center 24 13:41 KW   

  24
  13:20
 - Today's Visit Information 
Type of service Initial Visit
Arrival Mode Ambulatory,
 Walker
Accompanied by CARE GIVER
Patient Identification Verified (Name & Yes
) 
Height and Weight 
Height 5 ft 2 in
Weight 107 lb
Weight in Pounds 107.0 lbs
Body Mass Index (BMI) 19.5
BMI Classification Normal
BSA - Darrell 1.47
Vital Signs 
Temperature (97.8 F-99.1 F) 97.7 F L
Temperature Source Temporal
Pulse Rate () 79
Pulse Location Monitor
Respiratory Rate (12-18) 18
Respiratory rate source Observation
Oxygen Delivery Method Room Air
Blood Pressure (90//80) 148/57 H
Blood Pressure Mean 87
Source Monitor
Position Semi-Fowlers
Blood Pressure Location Left Arm
History Since Last Visit- (Skip if this 
is Patient's initial visit) 
Left Footwear Regular Shoe
Right Footwear Regular Shoe
Pain Scale: 0-10 Numeric 
Is Patient Pain Free? Yes
Lower Extremity Assessment/ Foot 
Assessment/ Toe Nail Assessment 
Right 
 -Posterior Tibial Doppler Multiphasic
 -Dorsalis Pedis Doppler Multiphasic
 -Extremity Color Red
 -Hair Growth on Legs Yes
 -Hair Growth on Toes No
 -Temperature of Extremity Warm
 -Capillary Refill Less than 3
 Seconds
 -Thick No
 -Discolored No
 -Deformed No
 -Improper Length & Hygeine No
Left 
 -Posterior Tibial Doppler Multiphasic
 -Dorsalis Pedis Doppler Monophasic
 -Hair Growth on Legs Yes
 -Hair Growth on Toes No
 -Temperature of Extremity Cool
 -Thick No
 -Discolored No
 -Deformed No
 -Improper Length & Hygeine No
Communication Assessment 
Preferred language English
 Required No
Able to Read Yes
Able to Write Yes
Communication Tools None
Caregiver Communication Skills No Impairment
Impairment 
Right Hearing Abillity Normal
Left Hearing Abillity Normal
Visual Assistive Devices Glasses
Teaching Assessment 
Preferences Verbal,Written,
 Demonstration
Barriers to Learning None
Readiness To Learn Excellent
Willingness to Engage in Self Management High
Activies 
Readiness to Engage in Self Management High
Activities 
Anxiety Level Calm
Cooperation Cooperative
Perception Coherent
Interest in Health Problem Asks Questions
Education Importance Acknowledges
 Need
Does Patient Smoke tobacco or other Yes
substances 
Smoking Status Former smoker
Is Patient Diabetic No
Functional Assessment 
Recent Decline in Ability to Perform Ambulation,
 Toileting
Culture/Sikhism/ 
Cultural/Sikhism Needs that may affect No
Treatment Plan 
Would you allow our hospital  to No
meet you for the purpose of spiritual/ 
emotional support? 
 to contact place of Taoism No

WC - Nurse 1 - General Ulcer Measurement              Start:  24 13:20
Freq:                                                 Status: Active        
Protocol:                                                                   
Activity Type Activity Date Activity User E-sign Co-sign Detail
Recorded Client Recorded Date Recorded By   
Document 24 13:20 KW   
wound center 24 13:41 KW   

  24
  13:20
Wound Center Nurse 1 
#1 RT POST LE 
 -Current Size (cm) - Length 1.2
 -Current Size (cm) - Width 0.7
 -Current Size (cm) - Depth 0.1
 -Total Square Cm 0.84
 -Date of Last Picture (Recall this 24
field) 
 -Exudate Amt Small
 -Exudate Type Serosanguineous
 -Wound Margin Distinct,
 Outline
 Attached
 -Granulation Amt Small (1-33%)
 -Granulation Quality Red
 -Necrosis Amt Large (%)
 -Necrotic Tissue Type Adherent Slough
 -Texture (Natalie-wound Skin Appearance) Assessed
 -Moisture (Natalie-wound Skin Appearance) Assessed
 -Color (Natalie-wound Skin Appearance) Assessed,
 Erythema
 -Temperature (Natalie-wound Skin No Abnormality
Appearance) (Pt Warm)
 -Tenderness on Palpation (Natalie-wound No
Skin Appearance) 
 -Ulcer Cleansing Rinsed/
 Irrigated with
 Saline
 -Foul Odor after Cleansing No
 -Anesthetic Used 5% Lidocaine
 Gel
Right Calf (cm) 31.6
Right Ankle (cm) 21.4
Left Calf (cm) 30.6
Left Ankle (cm) 20.2

WC - Nurse 2 - General Ulcer CM Notes                 Start:  24 13:20
Freq:                                                 Status: Active        
Protocol:                                                                   
Activity Type Activity Date Activity User E-sign Co-sign Detail
Recorded Client Recorded Date Recorded By   
Document 24 14:18 DS   
[id number] 24 14:22 DS   

  24
  14:18
Wound Center Nurse 2 
#1 RT POST LE 
 -Time 14:15
 -Correct Patient Yes
 -Correct Side, Site, Position Yes
 -Correct Procedure Yes
 -Procedure Performed Yes
 -Type of Procedure Debridement
 -Clinical Debridement Subcutaneous
 -Tissue Removed Subcutaneous
 -Post Debridement (cm) - Length 1.4
 -Post Debridement (cm) - Width 1.5
 -Post Debridement (cm) - Depth 0.1
 -Total Square (Post) (cm) 2.10
 -Area of Debridement (cm) - Length 1.4
 -Area of Debridement (cm) - Width 1.5
 -Total Square (Area) (cm) 2.10
 -Tunneling No
 -Undermining/Tunneling No
 -Circular Undermining No
 -Wound/Ulcer Outcome Not Healed
 -Ulcer Cleansing Rinsed/
 Irrigated with
 Saline
 -Bleeding Controlled with Pressure
 -Treatment Response Procedure
 Tolerated Well
 -Debridement - Subq, 1st 20sq cm Yes
Pain Scale: 0-10 Numeric 
Is Patient Pain Free? Yes




Assessment/Plan
Assessment/Plan
(1) Non-pressure chronic ulcer of calf: 
CODE(S):       L97.209 - Non-pressure chronic ulcer of unspecified calf with unspecified severity
QUALIFIERS:               Laterality: right  Non-pressure ulcer stage: with fat layer exposed  Qualified Code(s): L97.212 - Non-pressure chronic ulcer of right calf with fat layer exposed
(2) Non-pressure chronic ulcer of lower leg: 
CODE(S):       L97.909 - Non-pressure chronic ulcer of unspecified part of unspecified lower leg with unspecified severity
QUALIFIERS:               Laterality: right  Non-pressure ulcer stage: with fat layer exposed  Qualified Code(s): L97.912 - Non-pressure chronic ulcer of unspecified part of right lower leg with fat layer exposed
(3) Wound infection: 
CODE(S):       T14.8XXA - Other injury of unspecified body region, initial encounter; L08.9 - Local infection of the skin and subcutaneous tissue, unspecified
(4) Leg swelling: 
CODE(S):       M79.89 - Other specified soft tissue disorders
(5) Leg edema: 
CODE(S):       R60.0 - Localized edema
(6) Bipolar disorder: 
CODE(S):       F31.9 - Bipolar disorder, unspecified
(7) Depression: 
CODE(S):       F32.A - Depression, unspecified
(8) Dementia: 
CODE(S):       F03.90 - Unspecified dementia, unspecified severity, without behavioral disturbance, psychotic disturbance, mood disturbance, and anxiety
(9) Urinary incontinence: 
CODE(S):       R32 - Unspecified urinary incontinence
(10) Hypertension: 
CODE(S):       I10 - Essential (primary) hypertension
(11) Hypothyroidism: 
CODE(S):       E03.9 - Hypothyroidism, unspecified
(12) History of hip surgery: 
CODE(S):       Z98.890 - Other specified postprocedural states
PLAN: 
Plan
This is a 73-year-old female who presented with an ulceration on the right posterior calf, age-indeterminate.  The patient is a poor historian.  She suffers from dementia, bipolar disorder, and depression.  The etiology of the patient's ulceration 
is uncertain.  Following debridement, the ulceration appears generally healthy and well-vascularized.  There is no sign of infection or cellulitis at this time.  We are to implement the use of moistened Miryam, which is to be applied today the 
patient's right lower extremity is to be wrapped with a 3M 2 layer compression wrap, which will stay in place undisturbed for several days.  This is intended to discourage the patient from  picking or disturbing the ulcer site.  She is to return in 
approximately 3 days for reapplication of the ulcer dressing and the 3M 2 layer compression wrap.  Patient is to return in 1 week for reassessment.

Total time: 48 minutes

## 2024-06-07 VITALS
DIASTOLIC BLOOD PRESSURE: 69 MMHG | TEMPERATURE: 97.7 F | SYSTOLIC BLOOD PRESSURE: 150 MMHG | RESPIRATION RATE: 18 BRPM | HEART RATE: 73 BPM

## 2024-06-11 VITALS
SYSTOLIC BLOOD PRESSURE: 151 MMHG | HEART RATE: 76 BPM | TEMPERATURE: 96.08 F | RESPIRATION RATE: 18 BRPM | DIASTOLIC BLOOD PRESSURE: 61 MMHG

## 2024-06-11 NOTE — HP.PCM_ITS
History of Present Illness    
Date of Service: 24    
Chief Complaint: Ulceration of the right posterior calf    
History of Wound: This is a 73-year-old female who is a resident of [facility].  She suffers from bipolar disorder, depression, and   
dementia.  She is a poor historian.  She presented with an ulceration on the   
right posterior calf, age indeterminate.  The patient is known to be a   .  
 She has been advised in the past to wear compression stockings, but she refuse  
s, and is not compliant.  She has recently been prescribed cefadroxil 500 mg   
p.o. twice daily, for a 10-day course.  This was prescribed as a result of   
cultures of her ulceration which were performed on May 24, 2024, which revealed   
the presence of Staph aureus.  It is said that the patient suffers from swelling  
and edema in her lower extremities.  She sleeps on a flat mattress at night.    
However, she is not very active, and spends a good deal of each day sitting.    
She requires a walker for ambulation.  She has a history of hypertension and   
hypothyroidism.  She otherwise denies a history of myocardial infarction,   
cerebrovascular accident, diabetes mellitus, renal disease, pulmonary disease,   
and hyperlipidemia.  The patient is of relatively normal body habitus, with a   
BMI of 19.5.     
     
    
    
On license of [facility]    
Medical History (Reviewed 24 @ 17:26 by Dr. Evangelista Carvajal MD)    
    
Non-pressure chronic ulcer of calf    
Wound infection    
Leg edema    
Leg swelling    
Hypothyroidism    
Hypertension    
Urinary incontinence    
Dementia    
Depression    
Bipolar disorder    
Non-pressure chronic ulcer of lower leg    
    
    
                                Home Medications    
    
    
    
?Medication ?Instructions ?Recorded ?Last Taken ?Type    
     
Depakote 625 cap PO QHS 24 Unknown History    
     
aspirin 81 mg capsule 81 mg PO DAILY 24 Unknown History    
     
cholecalciferol (vitamin D3) 10 25 mcg PO DAILY 24 Unknown History    
    
mcg/mL (400 unit/mL) oral drops        
    
(Pedia D-Konstantin)        
     
donepezil 10 mg tablet 5 mg PO DAILY 24 Unknown History    
     
donepezil 5 mg tablet (Aricept) 5 mg PO QHS 24 Unknown History    
     
hydroxyzine HCl 25 mg tablet 25 mg PO Q8H PRN anxiety 24 Unknown History    
     
levothyroxine 50 mcg tablet 50 mcg PO DAILY 24 Unknown History    
     
loperamide 2 mg capsule 2 mg PO Q6H PRN loose stool 24 Unknown History    
     
melatonin 3 mg capsule 3 mg PO QHS 24 Unknown History    
     
mirtazapine 30 mg tablet 45 mg PO QHS 24 Unknown History    
     
mirtazapine 45 mg tablet 45 mg PO QHS 24 Unknown History    
    
    
    
                                            
    
    
    
Allergy/AdvReac Type Severity Reaction Status Date / Time    
     
carbamazepine Allergy Intermediate PT UNSURE Verified 24 13:16    
    
   OF REACTION      
    
    
    
Surgical History (Reviewed 24 @ 17:26 by Dr. Evangelista Carvajal MD)    
    
History of hip surgery    
    
    
Social History (Reviewed 24 @ 17:26 by Dr. Evangelista Carvajal MD)    
Smoking Status:  Former smoker     
    
    
    
Vital Signs    
Vital Signs    
Vital Signs:     
                                            
    
    
    
 24    
14:53    
     
Temperature 96.1 F L    
     
Temperature Source Temporal    
     
Pulse Rate 76    
     
Respiratory Rate 18    
     
Blood Pressure 151/61 H    
     
Blood Pressure Mean 91    
     
Blood Pressure Source Monitor    
     
Blood Pressure Position Semi-Fowlers    
     
Blood Pressure Location Left Arm    
     
Oxygen Delivery Method Room Air    
    
    
                                     Weight    
    
    
    
Weight:                        107 lb                                             
    
     
Body Mass Index (BMI)          19.5                                               
    
    
    
    
    
    
Physical Exam    
Const    
alert, no apparent distress, average body habitus and well nourished    
Constitutional Narrative:     
The patient is alert and interactive, though confused and unable to state her   
age and responds inappropriately to routine questions.  The patient is of   
relatively normal body habitus, with a BMI of 19.5.    
General Appearance: cooperative, comfortable, well kempt and well developed    
Orientation / Consciousness: awake, confused and disoriented    
HEENT    
normocephalic and head/scalp atraumatic    
Head and Scalp: normal to inspection, normocephalic and atraumatic    
Face and Sinus: normal facial exam    
External Ear: external ears normal    
Eyes    
EOMs intact bilaterally    
General Eye: normal appearance of both eyes    
Resp    
normal respiratory effort, normal air movement, no retractions and no use of   
accessory muscles    
Effort and Inspection: able to speak in complete sentences    
Extremity    
no calf tenderness    
General Extremity: Negative for clubbing or cyanosis    
Skin    
Wound Narrative:     
An ulceration is noted on the patient's right posterior calf.  It is generally   
pink and healthy in appearance, with no sign of infection or cellulitis.  There   
is a small amount of bioburden.  Dimensions are documented elsewhere.  The   
ulceration appears to be slightly smaller than that noted previously.  There is   
no significant swelling or edema noted in the patient's lower extremities at   
this time.      
Neuro    
CN's II-XII intact bilaterally, moves all extremities and no focal motor   
deficits    
Sensorium / Orientation: awake, alert, orientation impaired and confused    
Psych    
Appearance: grossly normal and appropriate    
Attitude: calm    
Activity / Motor Behavior: appropriate eye contact    
Speech: normal speech    
Mood & Affect: euthymic mood    
Thought Process: normal thought process    
Thought Content: normal thought content    
Attention / Concentration: attention grossly intact    
    
Debridement Note    
Debridement Note    
Wound debrided: Right posterior calf    
Laterality: Right    
Type of Debridement: Excisional debridement    
Anesthesia Used: 5% Lidocaine Gel    
Depth: Down to and including healthy tissue and in the subcutaneous layer    
Percentage of wound debrided: 100    
Instrument Used: 5mm curette    
Tissue Removed: Bioburden and senescent material    
Severity: Fat Layer Exposed    
Amount of bleeding with debridement: Mild    
Bleeding Controlled with: Compression and gauze    
Patient tolerated procedure: Patient tolerated procedure well    
Post-Debridement Measurements and Additional Note:     
Post-Debridement Measurements/Treatment    
    
 - Nurse 1 - General Ulcer Assessment               Start:  24 13:20    
Freq:                                                 Status: Active            
Protocol:  DAMASO                                                            
    
    
Activity Type Activity Date Activity User E-sign Co-sign Detail    
    
Recorded Client Recorded Date Recorded By      
     
Document 24 13:20 KW       
    
wound center 24 13:41 KW       
     
Document 24 11:58 RB       
    
wound center 24 12:00 RB       
     
Document 24 14:53 KW       
    
wound center 24 15:07 KW       
    
    
    
    
  24    
    
  13:20 11:58 14:53    
     
 - Today's Visit Information       
     
Type of service Initial Visit Follow-up Visit Follow-up Visit    
    
  (Physician/CNP (Physician/CNP    
    
  ) )    
     
Arrival Mode Ambulatory, Ambulatory, Ambulatory,    
    
 Walker Walker Walker    
     
Transfer Assistance  None     
     
Accompanied by CARE GIVER      
     
Patient Identification Verified (Name & Yes Yes Yes    
    
)       
     
Patient Requires Transmission-Based  No     
    
Precautions       
     
Height and Weight       
     
Height 5 ft 2 in      
     
Weight 107 lb      
     
Weight in Pounds 107.0 lbs      
     
Body Mass Index (BMI) 19.5 19.5 19.5    
     
BMI Classification Normal Normal Normal    
     
BSA - Darrell 1.47      
     
Vital Signs       
     
Temperature (97.8 F-99.1 F) 97.7 F L 97.7 F L 96.1 F L    
     
Temperature Source Temporal Temporal Temporal    
     
Pulse Rate () 79 73 76    
     
Pulse Location Monitor Monitor Monitor    
     
Respiratory Rate (12-18) 18 18 18    
     
Respiratory rate source Observation Observation Observation    
     
Oxygen Delivery Method Room Air  Room Air    
     
Blood Pressure (90//80) 148/57 H 150/69 H 151/61 H    
     
Blood Pressure Mean 87 96 91    
     
Source Monitor Monitor Monitor    
     
Position Semi-Fowlers Semi-Fowlers Semi-Fowlers    
     
Blood Pressure Location Left Arm Left Arm Left Arm    
     
History Since Last Visit- (Skip if this       
    
is Patient's initial visit)       
     
Have you changed medications since your  No No    
    
last visit?       
     
Any new allergies or adverse reactions  No No    
     
Had a fall/change in ADL's that may  No No    
    
increase risk of falls       
     
Signs or symptoms of abuse and/or  No No    
    
neglect since last visit       
     
Have you been in the hospital since your  No No    
    
last visit?       
     
Has dressing in place as prescribed  Yes Yes    
     
Has compression in place as prescribed  Yes Yes    
     
Has offloadiing in place as prescribed  No N/A    
     
Experienced any changes in pain level or  No No    
    
management       
     
Left Footwear Regular Shoe  Regular Shoe    
     
Right Footwear Regular Shoe  Regular Shoe    
     
Pain Scale: 0-10 Numeric       
     
Is Patient Pain Free? Yes Yes Yes    
     
Lower Extremity Assessment/ Foot       
    
Assessment/ Toe Nail Assessment       
     
Right       
     
 -Posterior Tibial Doppler Multiphasic      
     
 -Dorsalis Pedis Doppler Multiphasic      
     
 -Extremity Color Red      
     
 -Hair Growth on Legs Yes      
     
 -Hair Growth on Toes No      
     
 -Temperature of Extremity Warm      
     
 -Capillary Refill Less than 3      
    
 Seconds      
     
 -Thick No      
     
 -Discolored No      
     
 -Deformed No      
     
 -Improper Length & Hygeine No      
     
Left       
     
 -Posterior Tibial Doppler Multiphasic      
     
 -Dorsalis Pedis Doppler Monophasic      
     
 -Hair Growth on Legs Yes      
     
 -Hair Growth on Toes No      
     
 -Temperature of Extremity Cool      
     
 -Thick No      
     
 -Discolored No      
     
 -Deformed No      
     
 -Improper Length & Hygeine No      
     
Communication Assessment       
     
Preferred language English      
     
 Required No      
     
Able to Read Yes      
     
Able to Write Yes      
     
Communication Tools None      
     
Caregiver Communication Skills No Impairment      
    
Impairment       
     
Right Hearing Abillity Normal      
     
Left Hearing Abillity Normal      
     
Visual Assistive Devices Glasses      
     
Teaching Assessment       
     
Preferences Verbal,Written,      
    
 Demonstration      
     
Barriers to Learning None      
     
Readiness To Learn Excellent      
     
Willingness to Engage in Self Management High      
    
Activies       
     
Readiness to Engage in Self Management High      
    
Activities       
     
Anxiety Level Calm      
     
Cooperation Cooperative      
     
Perception Coherent      
     
Interest in Health Problem Asks Questions      
     
Education Importance Acknowledges      
    
 Need      
     
Does Patient Smoke tobacco or other Yes      
    
substances       
     
Smoking Status Former smoker      
     
Is Patient Diabetic No      
     
Functional Assessment       
     
Recent Decline in Ability to Perform Ambulation,      
    
 Toileting      
     
Culture/Uatsdin/       
     
Cultural/Uatsdin Needs that may affect No      
    
Treatment Plan       
     
Would you allow our hospital  to No      
    
meet you for the purpose of spiritual/       
    
emotional support?       
     
 to contact place of [facility] No      
     
Teaching: Wound Center       
     
Compression Wraps & Stockings       
     
 -Person Taught  Patient     
     
 -Teaching Method  Discussion     
     
 -Response to teaching  Verbalize     
    
  understanding     
    
    
WC - Nurse 1 - General Ulcer Measurement              Start:  24 13:20    
Freq:                                                 Status: Active            
Protocol:                                                                       
    
    
Activity Type Activity Date Activity User E-sign Co-sign Detail    
    
Recorded Client Recorded Date Recorded By      
     
Document 24 13:20 KW       
    
wound center 24 13:41 KW       
     
Document 24 11:58 RB       
    
wound center 24 12:00 RB       
     
Document 24 14:53 KW       
    
wound center 24 15:07 KW       
    
    
    
    
  24    
    
  13:20 11:58 14:53    
     
Wound Center Nurse 1       
     
#1 RT POST LE       
     
 -Current Size (cm) - Length 1.2  1.3    
     
 -Current Size (cm) - Width 0.7  0.4    
     
 -Current Size (cm) - Depth 0.1  0.1    
     
 -Total Square Cm 0.84  0.52    
     
 -Date of Last Picture (Recall this 24      
    
field)       
     
 -Tunneling  No     
     
 -Undermining/Tunneling  No     
     
 -Classification - Thickness  Partial     
    
  Thickness     
     
 -Exudate Amt Small Medium Small    
     
 -Exudate Type Serosanguineous Serosanguineous Serosanguineous    
     
 -Wound Margin Distinct, Distinct, Distinct,    
    
 Outline Outline Outline    
    
 Attached Attached Attached    
     
 -Granulation Amt Small (1-33%) Medium (34-66%) Large (%)    
     
 -Granulation Quality Red Pink Red    
     
 -Slough/Fibrin  Yes     
     
 -Necrosis Amt Large (%) Medium (34-66%)     
     
 -Necrotic Tissue Type Adherent Slough Adherent Slough     
     
 -Structure Exposed  N/A     
     
 -Texture (Natalie-wound Skin Appearance) Assessed Assessed Assessed    
     
 -Moisture (Natalie-wound Skin Appearance) Assessed Assessed Assessed    
     
 -Color (Natalie-wound Skin Appearance) Assessed, Assessed Assessed    
    
 Erythema      
     
 -Temperature (Natalie-wound Skin No Abnormality No Abnormality No Abnormality    
    
Appearance) (Pt Warm) (Pt Warm) (Pt Warm)    
     
 -Tenderness on Palpation (Natalie-wound No No No    
    
Skin Appearance)       
     
 -Ulcer Cleansing Rinsed/ Wound Cleanser Soap and Water    
    
 Irrigated with      
    
 Saline      
     
 -Foul Odor after Cleansing No No No    
     
 -Anesthetic Used 5% Lidocaine  5% Lidocaine    
    
 Gel  Gel    
     
Lower Limb Edema Present  Yes     
     
Right Calf (cm) 31.6 29.5 29    
     
Right Ankle (cm) 21.4 19.5 20    
     
Left Calf (cm) 30.6      
     
Left Ankle (cm) 20.2      
    
    
WC - Nurse 2 - General Ulcer CM Notes                 Start:  24 13:20    
Freq:                                                 Status: Active            
Protocol:                                                                       
    
    
Activity Type Activity Date Activity User E-sign Co-sign Detail    
    
Recorded Client Recorded Date Recorded By      
     
Document 24 14:18 DS       
    
[id number] 24 14:22 DS       
     
Document 24 15:32 DS       
    
1 24 15:33 DS       
    
    
    
    
  24    
    
  14:18 15:32    
     
Wound Center Nurse 2      
     
#1 RT POST LE      
     
 -Time 14:15 15:30    
     
 -Correct Patient Yes Yes    
     
 -Correct Side, Site, Position Yes Yes    
     
 -Correct Procedure Yes Yes    
     
 -Procedure Performed Yes Yes    
     
 -Type of Procedure Debridement Debridement    
     
 -Clinical Debridement Subcutaneous Subcutaneous    
     
 -Tissue Removed Subcutaneous Subcutaneous    
     
 -Post Debridement (cm) - Length 1.4 1    
     
 -Post Debridement (cm) - Width 1.5 0.8    
     
 -Post Debridement (cm) - Depth 0.1 0.1    
     
 -Total Square (Post) (cm) 2.10 0.8    
     
 -Area of Debridement (cm) - Length 1.4 1.0    
     
 -Area of Debridement (cm) - Width 1.5 0.8    
     
 -Total Square (Area) (cm) 2.10 0.80    
     
 -Tunneling No No    
     
 -Undermining/Tunneling No No    
     
 -Circular Undermining No No    
     
 -Wound/Ulcer Outcome Not Healed Not Healed    
     
 -Ulcer Cleansing Rinsed/ Rinsed/    
    
 Irrigated with Irrigated with    
    
 Saline Saline    
     
 -Bleeding Controlled with Pressure Pressure    
     
 -Treatment Response Procedure Procedure    
    
 Tolerated Well Tolerated Well    
     
 -Debridement - Subq, 1st 20sq cm Yes Yes    
     
Pain Scale: 0-10 Numeric      
     
Is Patient Pain Free? Yes Yes    
    
    
WC - Nurse 3 - General Ulcer D/C NN                   Start:  24 13:20    
Freq:                                                 Status: Active            
Protocol:                                                                       
    
    
Activity Type Activity Date Activity User E-sign Co-sign Detail    
    
Recorded Client Recorded Date Recorded By      
     
Document 24 11:58 RB       
    
wound center 24 12:00 RB       
     
Document 24 15:43 KW       
    
wound center 24 15:44 KW       
    
    
    
    
  06/07/24 06/11/24    
    
  11:58 15:43    
     
Vital Signs      
     
Temperature (97.8 F-99.1 F) 97.7 F L     
     
Temperature Source Temporal     
     
Pulse Rate () 73     
     
Pulse Location Monitor     
     
Respiratory Rate (12-18) 18     
     
Respiratory rate source Observation     
     
Blood Pressure (90//80) 150/69 H     
     
Blood Pressure Mean 96     
     
Source Monitor     
     
Position Semi-Fowlers     
     
Blood Pressure Location Left Arm     
     
Pain Scale: 0-10 Numeric      
     
Is Patient Pain Free? Yes Yes    
     
Teaching: Wound Center      
     
Compression Wraps & Stockings      
     
 -Person Taught Patient     
     
 -Teaching Method Discussion     
     
 -Response to teaching Verbalize     
    
 understanding     
     
Wound Care Center Nurse 3      
     
#1 RT POST LE      
     
 -Ulcer Cleansing  Rinsed/    
    
  Irrigated with    
    
  Saline    
     
 -Primary Dressing Applied Promogran Promogran    
    
 Miryam Matter Miryam Matter    
     
 -Primary Dressing Covered/Secured with Dry Gauze Dry Gauze &    
    
  Roll Gauze    
     
 -Promogran Miryam Matter 1 1    
     
Right      
     
 -Multi-Layered Wrap Application Multi-Layer Multi-Layer    
    
 Comp - Right ($ Comp - Right ($    
    
 ) )    
     
Treatment Response Procedure     
    
 Tolerated Well     
     
WC - Visit Discharge      
     
Discharge Condition Stable Stable    
     
Ambulatory Status Ambulatory, Ambulatory,    
    
 Walker Walker    
     
Transportation Private Auto Private Auto    
     
Medication Reconcilliation completed & No No    
    
provided to patient/care provider      
     
Clinical Summary of Care Provided Yes Yes    
    
    
    
    
    
Assessment/Plan    
Assessment/Plan    
(1) Non-pressure chronic ulcer of calf:     
CODE(S):       L97.209 - Non-pressure chronic ulcer of unspecified calf with   
unspecified severity    
QUALIFIERS:               Laterality: right  Non-pressure ulcer stage: with fat   
layer exposed  Qualified Code(s): L97.212 - Non-pressure chronic ulcer of right   
calf with fat layer exposed    
(2) Non-pressure chronic ulcer of lower leg:     
CODE(S):       L97.909 - Non-pressure chronic ulcer of unspecified part of   
unspecified lower leg with unspecified severity    
QUALIFIERS:               Laterality: right  Non-pressure ulcer stage: with fat   
layer exposed  Qualified Code(s): L97.912 - Non-pressure chronic ulcer of   
unspecified part of right lower leg with fat layer exposed    
(3) Wound infection:     
CODE(S):       T14.8XXA - Other injury of unspecified body region, initial   
encounter; L08.9 - Local infection of the skin and subcutaneous tissue,   
unspecified    
(4) Leg swelling:     
CODE(S):       M79.89 - Other specified soft tissue disorders    
(5) Leg edema:     
CODE(S):       R60.0 - Localized edema    
(6) Bipolar disorder:     
CODE(S):       F31.9 - Bipolar disorder, unspecified    
(7) Depression:     
CODE(S):       F32.A - Depression, unspecified    
(8) Dementia:     
CODE(S):       F03.90 - Unspecified dementia, unspecified severity, without   
behavioral disturbance, psychotic disturbance, mood disturbance, and anxiety    
(9) Urinary incontinence:     
CODE(S):       R32 - Unspecified urinary incontinence    
(10) Hypertension:     
CODE(S):       I10 - Essential (primary) hypertension    
(11) Hypothyroidism:     
CODE(S):       E03.9 - Hypothyroidism, unspecified    
(12) History of hip surgery:     
CODE(S):       Z98.890 - Other specified postprocedural states    
PLAN:     
Plan    
This is a 73-year-old female who presented with an ulceration on the right   
posterior calf, age-indeterminate.  The patient is a poor historian.  She   
suffers from dementia, bipolar disorder, and depression.  The etiology of the   
patient's ulceration is uncertain.  The ulceration appears generally healthy and  
well-vascularized.  There is no sign of infection or cellulitis at this time.    
We are to continue the use of moistened Miryam topically.  The patient's right   
lower extremity is to be wrapped with a 3M 2 layer compression wrap, which will   
stay in place undisturbed for several days.  This is intended to discourage the   
patient from  picking or disturbing the ulcer site.  She is to return in   
approximately 3 days for reapplication of the ulcer dressing and the 3M 2 layer   
compression wrap.  The patient is to return in 1 week for reassessment.    
    
Total time: 24 minutes

## 2024-06-11 NOTE — PCM.WC.HP
History of Present Illness
Date of Service: 24
Chief Complaint: Ulceration of the right posterior calf
History of Wound: This is a 73-year-old female who is a resident of [facility].  She suffers from bipolar disorder, depression, and dementia.  She is a poor historian.  She presented with an ulceration on the right posterior 
calf, age indeterminate.  The patient is known to be a   .  She has been advised in the past to wear compression stockings, but she refuses, and is not compliant.  She has recently been prescribed cefadroxil 500 mg p.o. twice daily, for a 
10-day course.  This was prescribed as a result of cultures of her ulceration which were performed on May 24, 2024, which revealed the presence of Staph aureus.  It is said that the patient suffers from swelling and edema in her lower extremities.  
She sleeps on a flat mattress at night.  However, she is not very active, and spends a good deal of each day sitting.  She requires a walker for ambulation.  She has a history of hypertension and hypothyroidism.  She otherwise denies a history of 
myocardial infarction, cerebrovascular accident, diabetes mellitus, renal disease, pulmonary disease, and hyperlipidemia.  The patient is of relatively normal body habitus, with a BMI of 19.5. 
 


[facility]
Medical History (Reviewed 24 @ 17:26 by Dr. Evangelista Carvajal MD)

Non-pressure chronic ulcer of calf
Wound infection
Leg edema
Leg swelling
Hypothyroidism
Hypertension
Urinary incontinence
Dementia
Depression
Bipolar disorder
Non-pressure chronic ulcer of lower leg


Home Medications

?Medication ?Instructions ?Recorded ?Last Taken ?Type
Depakote 625 cap PO QHS 24 Unknown History
aspirin 81 mg capsule 81 mg PO DAILY 24 Unknown History
cholecalciferol (vitamin D3) 10 25 mcg PO DAILY 24 Unknown History
mcg/mL (400 unit/mL) oral drops    
(Pedia D-Konstantin)    
donepezil 10 mg tablet 5 mg PO DAILY 24 Unknown History
donepezil 5 mg tablet (Aricept) 5 mg PO QHS 24 Unknown History
hydroxyzine HCl 25 mg tablet 25 mg PO Q8H PRN anxiety 24 Unknown History
levothyroxine 50 mcg tablet 50 mcg PO DAILY 24 Unknown History
loperamide 2 mg capsule 2 mg PO Q6H PRN loose stool 24 Unknown History
melatonin 3 mg capsule 3 mg PO QHS 24 Unknown History
mirtazapine 30 mg tablet 45 mg PO QHS 24 Unknown History
mirtazapine 45 mg tablet 45 mg PO QHS 24 Unknown History




Allergy/AdvReac Type Severity Reaction Status Date / Time
carbamazepine Allergy Intermediate PT UNSURE Verified 24 13:16
   OF REACTION  


Surgical History (Reviewed 24 @ 17:26 by Dr. Evangelista Carvajal MD)

History of hip surgery


Social History (Reviewed 24 @ 17:26 by Dr. Evangelista Carvajal MD)
Smoking Status:  Former smoker 



Vital Signs
Vital Signs
Vital Signs: 


 24
14:53
Temperature 96.1 F L
Temperature Source Temporal
Pulse Rate 76
Respiratory Rate 18
Blood Pressure 151/61 H
Blood Pressure Mean 91
Blood Pressure Source Monitor
Blood Pressure Position Semi-Fowlers
Blood Pressure Location Left Arm
Oxygen Delivery Method Room Air

Weight

Weight:                        107 lb                                            
Body Mass Index (BMI)          19.5                                              




Physical Exam
Const
alert, no apparent distress, average body habitus and well nourished
Constitutional Narrative: 
The patient is alert and interactive, though confused and unable to state her age and responds inappropriately to routine questions.  The patient is of relatively normal body habitus, with a BMI of 19.5.
General Appearance: cooperative, comfortable, well kempt and well developed
Orientation / Consciousness: awake, confused and disoriented
HEENT
normocephalic and head/scalp atraumatic
Head and Scalp: normal to inspection, normocephalic and atraumatic
Face and Sinus: normal facial exam
External Ear: external ears normal
Eyes
EOMs intact bilaterally
General Eye: normal appearance of both eyes
Resp
normal respiratory effort, normal air movement, no retractions and no use of accessory muscles
Effort and Inspection: able to speak in complete sentences
Extremity
no calf tenderness
General Extremity: Negative for clubbing or cyanosis
Skin
Wound Narrative: 
An ulceration is noted on the patient's right posterior calf.  It is generally pink and healthy in appearance, with no sign of infection or cellulitis.  There is a small amount of bioburden.  Dimensions are documented elsewhere.  The ulceration 
appears to be slightly smaller than that noted previously.  There is no significant swelling or edema noted in the patient's lower extremities at this time.  
Neuro
CN's II-XII intact bilaterally, moves all extremities and no focal motor deficits
Sensorium / Orientation: awake, alert, orientation impaired and confused
Psych
Appearance: grossly normal and appropriate
Attitude: calm
Activity / Motor Behavior: appropriate eye contact
Speech: normal speech
Mood & Affect: euthymic mood
Thought Process: normal thought process
Thought Content: normal thought content
Attention / Concentration: attention grossly intact

Debridement Note
Debridement Note
Wound debrided: Right posterior calf
Laterality: Right
Type of Debridement: Excisional debridement
Anesthesia Used: 5% Lidocaine Gel
Depth: Down to and including healthy tissue and in the subcutaneous layer
Percentage of wound debrided: 100
Instrument Used: 5mm curette
Tissue Removed: Bioburden and senescent material
Severity: Fat Layer Exposed
Amount of bleeding with debridement: Mild
Bleeding Controlled with: Compression and gauze
Patient tolerated procedure: Patient tolerated procedure well
Post-Debridement Measurements and Additional Note: 
Post-Debridement Measurements/Treatment

 - Nurse 1 - General Ulcer Assessment               Start:  24 13:20
Freq:                                                 Status: Active        
Protocol:  .MIO                                                        
Activity Type Activity Date Activity User E-sign Co-sign Detail
Recorded Client Recorded Date Recorded By   
Document 24 13:20 KW   
wound center 24 13:41 KW   
Document 24 11:58 RB   
wound center 24 12:00 RB   
Document 24 14:53 KW   
wound center 24 15:07 KW   

  24
  13:20 11:58 14:53
 - Today's Visit Information   
Type of service Initial Visit Follow-up Visit Follow-up Visit
  (Physician/CNP (Physician/CNP
  ) )
Arrival Mode Ambulatory, Ambulatory, Ambulatory,
 Walker Walker Walker
Transfer Assistance  None 
Accompanied by CARE GIVER  
Patient Identification Verified (Name & Yes Yes Yes
)   
Patient Requires Transmission-Based  No 
Precautions   
Height and Weight   
Height 5 ft 2 in  
Weight 107 lb  
Weight in Pounds 107.0 lbs  
Body Mass Index (BMI) 19.5 19.5 19.5
BMI Classification Normal Normal Normal
BSA - Darrell 1.47  
Vital Signs   
Temperature (97.8 F-99.1 F) 97.7 F L 97.7 F L 96.1 F L
Temperature Source Temporal Temporal Temporal
Pulse Rate () 79 73 76
Pulse Location Monitor Monitor Monitor
Respiratory Rate (12-18) 18 18 18
Respiratory rate source Observation Observation Observation
Oxygen Delivery Method Room Air  Room Air
Blood Pressure (90//80) 148/57 H 150/69 H 151/61 H
Blood Pressure Mean 87 96 91
Source Monitor Monitor Monitor
Position Semi-Fowlers Semi-Fowlers Semi-Fowlers
Blood Pressure Location Left Arm Left Arm Left Arm
History Since Last Visit- (Skip if this   
is Patient's initial visit)   
Have you changed medications since your  No No
last visit?   
Any new allergies or adverse reactions  No No
Had a fall/change in ADL's that may  No No
increase risk of falls   
Signs or symptoms of abuse and/or  No No
neglect since last visit   
Have you been in the hospital since your  No No
last visit?   
Has dressing in place as prescribed  Yes Yes
Has compression in place as prescribed  Yes Yes
Has offloadiing in place as prescribed  No N/A
Experienced any changes in pain level or  No No
management   
Left Footwear Regular Shoe  Regular Shoe
Right Footwear Regular Shoe  Regular Shoe
Pain Scale: 0-10 Numeric   
Is Patient Pain Free? Yes Yes Yes
Lower Extremity Assessment/ Foot   
Assessment/ Toe Nail Assessment   
Right   
 -Posterior Tibial Doppler Multiphasic  
 -Dorsalis Pedis Doppler Multiphasic  
 -Extremity Color Red  
 -Hair Growth on Legs Yes  
 -Hair Growth on Toes No  
 -Temperature of Extremity Warm  
 -Capillary Refill Less than 3  
 Seconds  
 -Thick No  
 -Discolored No  
 -Deformed No  
 -Improper Length & Hygeine No  
Left   
 -Posterior Tibial Doppler Multiphasic  
 -Dorsalis Pedis Doppler Monophasic  
 -Hair Growth on Legs Yes  
 -Hair Growth on Toes No  
 -Temperature of Extremity Cool  
 -Thick No  
 -Discolored No  
 -Deformed No  
 -Improper Length & Hygeine No  
Communication Assessment   
Preferred language English  
 Required No  
Able to Read Yes  
Able to Write Yes  
Communication Tools None  
Caregiver Communication Skills No Impairment  
Impairment   
Right Hearing Abillity Normal  
Left Hearing Abillity Normal  
Visual Assistive Devices Glasses  
Teaching Assessment   
Preferences Verbal,Written,  
 Demonstration  
Barriers to Learning None  
Readiness To Learn Excellent  
Willingness to Engage in Self Management High  
Activies   
Readiness to Engage in Self Management High  
Activities   
Anxiety Level Calm  
Cooperation Cooperative  
Perception Coherent  
Interest in Health Problem Asks Questions  
Education Importance Acknowledges  
 Need  
Does Patient Smoke tobacco or other Yes  
substances   
Smoking Status Former smoker  
Is Patient Diabetic No  
Functional Assessment   
Recent Decline in Ability to Perform Ambulation,  
 Toileting  
Culture/Christianity/   
Cultural/Christianity Needs that may affect No  
Treatment Plan   
Would you allow our hospital  to No  
meet you for the purpose of spiritual/   
emotional support?   
 to contact place of [employer] No  
Teaching: Wound Center   
Compression Wraps & Stockings   
 -Person Taught  Patient 
 -Teaching Method  Discussion 
 -Response to teaching  Verbalize 
  understanding 

WC - Nurse 1 - General Ulcer Measurement              Start:  24 13:20
Freq:                                                 Status: Active        
Protocol:                                                                   
Activity Type Activity Date Activity User E-sign Co-sign Detail
Recorded Client Recorded Date Recorded By   
Document 24 13:20 KW   
wound center 24 13:41 KW   
Document 24 11:58 RB   
wound center 24 12:00 RB   
Document 24 14:53 KW   
wound center 24 15:07 KW   

  24
  13:20 11:58 14:53
Wound Center Nurse 1   
#1 RT POST LE   
 -Current Size (cm) - Length 1.2  1.3
 -Current Size (cm) - Width 0.7  0.4
 -Current Size (cm) - Depth 0.1  0.1
 -Total Square Cm 0.84  0.52
 -Date of Last Picture (Recall this 24  
field)   
 -Tunneling  No 
 -Undermining/Tunneling  No 
 -Classification - Thickness  Partial 
  Thickness 
 -Exudate Amt Small Medium Small
 -Exudate Type Serosanguineous Serosanguineous Serosanguineous
 -Wound Margin Distinct, Distinct, Distinct,
 Outline Outline Outline
 Attached Attached Attached
 -Granulation Amt Small (1-33%) Medium (34-66%) Large (%)
 -Granulation Quality Red Pink Red
 -Slough/Fibrin  Yes 
 -Necrosis Amt Large (%) Medium (34-66%) 
 -Necrotic Tissue Type Adherent Slough Adherent Slough 
 -Structure Exposed  N/A 
 -Texture (Natalie-wound Skin Appearance) Assessed Assessed Assessed
 -Moisture (Natalie-wound Skin Appearance) Assessed Assessed Assessed
 -Color (Natalie-wound Skin Appearance) Assessed, Assessed Assessed
 Erythema  
 -Temperature (Natalie-wound Skin No Abnormality No Abnormality No Abnormality
Appearance) (Pt Warm) (Pt Warm) (Pt Warm)
 -Tenderness on Palpation (Natalie-wound No No No
Skin Appearance)   
 -Ulcer Cleansing Rinsed/ Wound Cleanser Soap and Water
 Irrigated with  
 Saline  
 -Foul Odor after Cleansing No No No
 -Anesthetic Used 5% Lidocaine  5% Lidocaine
 Gel  Gel
Lower Limb Edema Present  Yes 
Right Calf (cm) 31.6 29.5 29
Right Ankle (cm) 21.4 19.5 20
Left Calf (cm) 30.6  
Left Ankle (cm) 20.2  

WC - Nurse 2 - General Ulcer CM Notes                 Start:  24 13:20
Freq:                                                 Status: Active        
Protocol:                                                                   
Activity Type Activity Date Activity User E-sign Co-sign Detail
Recorded Client Recorded Date Recorded By   
Document 24 14:18 DS   
[id number] 24 14:22 DS   
Document 24 15:32 DS   
1 24 15:33 DS   

  24
  14:18 15:32
Wound Center Nurse 2  
#1 RT POST LE  
 -Time 14:15 15:30
 -Correct Patient Yes Yes
 -Correct Side, Site, Position Yes Yes
 -Correct Procedure Yes Yes
 -Procedure Performed Yes Yes
 -Type of Procedure Debridement Debridement
 -Clinical Debridement Subcutaneous Subcutaneous
 -Tissue Removed Subcutaneous Subcutaneous
 -Post Debridement (cm) - Length 1.4 1
 -Post Debridement (cm) - Width 1.5 0.8
 -Post Debridement (cm) - Depth 0.1 0.1
 -Total Square (Post) (cm) 2.10 0.8
 -Area of Debridement (cm) - Length 1.4 1.0
 -Area of Debridement (cm) - Width 1.5 0.8
 -Total Square (Area) (cm) 2.10 0.80
 -Tunneling No No
 -Undermining/Tunneling No No
 -Circular Undermining No No
 -Wound/Ulcer Outcome Not Healed Not Healed
 -Ulcer Cleansing Rinsed/ Rinsed/
 Irrigated with Irrigated with
 Saline Saline
 -Bleeding Controlled with Pressure Pressure
 -Treatment Response Procedure Procedure
 Tolerated Well Tolerated Well
 -Debridement - Subq, 1st 20sq cm Yes Yes
Pain Scale: 0-10 Numeric  
Is Patient Pain Free? Yes Yes

WC - Nurse 3 - General Ulcer D/C NN                   Start:  24 13:20
Freq:                                                 Status: Active        
Protocol:                                                                   
Activity Type Activity Date Activity User E-sign Co-sign Detail
Recorded Client Recorded Date Recorded By   
Document 24 11:58 RB   
wound center 24 12:00 RB   
Document 24 15:43 KW   
wound center 24 15:44 KW   

  24
  11:58 15:43
Vital Signs  
Temperature (97.8 F-99.1 F) 97.7 F L 
Temperature Source Temporal 
Pulse Rate () 73 
Pulse Location Monitor 
Respiratory Rate (12-18) 18 
Respiratory rate source Observation 
Blood Pressure (90//80) 150/69 H 
Blood Pressure Mean 96 
Source Monitor 
Position Semi-Fowlers 
Blood Pressure Location Left Arm 
Pain Scale: 0-10 Numeric  
Is Patient Pain Free? Yes Yes
Teaching: Wound Center  
Compression Wraps & Stockings  
 -Person Taught Patient 
 -Teaching Method Discussion 
 -Response to teaching Verbalize 
 understanding 
Wound Care Center Nurse 3  
#1 RT POST LE  
 -Ulcer Cleansing  Rinsed/
  Irrigated with
  Saline
 -Primary Dressing Applied Promogran Promogran
 Miryam Matter Miryam Matter
 -Primary Dressing Covered/Secured with Dry Gauze Dry Gauze &
  Roll Gauze
 -Promogran Miryam Matter 1 1
Right  
 -Multi-Layered Wrap Application Multi-Layer Multi-Layer
 Comp - Right ($ Comp - Right ($
 ) )
Treatment Response Procedure 
 Tolerated Well 
WC - Visit Discharge  
Discharge Condition Stable Stable
Ambulatory Status Ambulatory, Ambulatory,
 Walker Walker
Transportation Private Auto Private Auto
Medication Reconcilliation completed & No No
provided to patient/care provider  
Clinical Summary of Care Provided Yes Yes




Charges/Coding
Procedures Integumentary
[id number]: [id number] May subq tissue 20 sq cm/<

Assessment/Plan
Assessment/Plan
(1) Non-pressure chronic ulcer of calf: 
CODE(S):       L97.209 - Non-pressure chronic ulcer of unspecified calf with unspecified severity
QUALIFIERS:               Laterality: right  Non-pressure ulcer stage: with fat layer exposed  Qualified Code(s): L97.212 - Non-pressure chronic ulcer of right calf with fat layer exposed
(2) Non-pressure chronic ulcer of lower leg: 
CODE(S):       L97.909 - Non-pressure chronic ulcer of unspecified part of unspecified lower leg with unspecified severity
QUALIFIERS:               Laterality: right  Non-pressure ulcer stage: with fat layer exposed  Qualified Code(s): L97.912 - Non-pressure chronic ulcer of unspecified part of right lower leg with fat layer exposed
(3) Wound infection: 
CODE(S):       T14.8XXA - Other injury of unspecified body region, initial encounter; L08.9 - Local infection of the skin and subcutaneous tissue, unspecified
(4) Leg swelling: 
CODE(S):       M79.89 - Other specified soft tissue disorders
(5) Leg edema: 
CODE(S):       R60.0 - Localized edema
(6) Bipolar disorder: 
CODE(S):       F31.9 - Bipolar disorder, unspecified
(7) Depression: 
CODE(S):       F32.A - Depression, unspecified
(8) Dementia: 
CODE(S):       F03.90 - Unspecified dementia, unspecified severity, without behavioral disturbance, psychotic disturbance, mood disturbance, and anxiety
(9) Urinary incontinence: 
CODE(S):       R32 - Unspecified urinary incontinence
(10) Hypertension: 
CODE(S):       I10 - Essential (primary) hypertension
(11) Hypothyroidism: 
CODE(S):       E03.9 - Hypothyroidism, unspecified
(12) History of hip surgery: 
CODE(S):       Z98.890 - Other specified postprocedural states
PLAN: 
Plan
This is a 73-year-old female who presented with an ulceration on the right posterior calf, age-indeterminate.  The patient is a poor historian.  She suffers from dementia, bipolar disorder, and depression.  The etiology of the patient's ulceration 
is uncertain.  The ulceration appears generally healthy and well-vascularized.  There is no sign of infection or cellulitis at this time.  We are to continue the use of moistened Miryam topically.  The patient's right lower extremity is to be 
wrapped with a 3M 2 layer compression wrap, which will stay in place undisturbed for several days.  This is intended to discourage the patient from  picking or disturbing the ulcer site.  She is to return in approximately 3 days for reapplication of 
the ulcer dressing and the 3M 2 layer compression wrap.  The patient is to return in 1 week for reassessment.

Total time: 24 minutes

## 2024-06-14 VITALS
HEART RATE: 85 BPM | DIASTOLIC BLOOD PRESSURE: 71 MMHG | RESPIRATION RATE: 18 BRPM | SYSTOLIC BLOOD PRESSURE: 143 MMHG | TEMPERATURE: 97.7 F

## 2024-06-18 VITALS — DIASTOLIC BLOOD PRESSURE: 77 MMHG | HEART RATE: 105 BPM | RESPIRATION RATE: 16 BRPM | SYSTOLIC BLOOD PRESSURE: 180 MMHG

## 2024-06-18 NOTE — PCM.WC.HP
History of Present Illness
Date of Service: 24
Chief Complaint: Ulceration of the right posterior calf
History of Wound: This is a 73-year-old female who is a resident of [facility].  She suffers from bipolar disorder, depression, and dementia.  She is a poor historian.  She presented with an ulceration on the right posterior 
calf, age indeterminate.  The patient is known to be a   .  She has been advised in the past to wear compression stockings, but she refuses, and is not compliant.  She has recently been prescribed cefadroxil 500 mg p.o. twice daily, for a 
10-day course.  This was prescribed as a result of cultures of her ulceration which were performed on May 24, 2024, which revealed the presence of Staph aureus.  It is said that the patient suffers from swelling and edema in her lower extremities.  
She sleeps on a flat mattress at night.  However, she is not very active, and spends a good deal of each day sitting.  She requires a walker for ambulation.  She has a history of hypertension and hypothyroidism.  She otherwise denies a history of 
myocardial infarction, cerebrovascular accident, diabetes mellitus, renal disease, pulmonary disease, and hyperlipidemia.  The patient is of relatively normal body habitus, with a BMI of 19.5. 
 


[facility]
Medical History (Reviewed 24 @ 16:01 by Dr. Evangelista Carvajal MD)

Non-pressure chronic ulcer of calf
Wound infection
Leg edema
Leg swelling
Hypothyroidism
Hypertension
Urinary incontinence
Dementia
Depression
Bipolar disorder
Non-pressure chronic ulcer of lower leg


Home Medications

?Medication ?Instructions ?Recorded ?Last Taken ?Type
Depakote 625 cap PO QHS 24 Unknown History
aspirin 81 mg capsule 81 mg PO DAILY 24 Unknown History
cholecalciferol (vitamin D3) 10 25 mcg PO DAILY 24 Unknown History
mcg/mL (400 unit/mL) oral drops    
(Pedia D-Konstantin)    
donepezil 10 mg tablet 5 mg PO DAILY 24 Unknown History
donepezil 5 mg tablet (Aricept) 5 mg PO QHS 24 Unknown History
hydroxyzine HCl 25 mg tablet 25 mg PO Q8H PRN anxiety 24 Unknown History
levothyroxine 50 mcg tablet 50 mcg PO DAILY 24 Unknown History
loperamide 2 mg capsule 2 mg PO Q6H PRN loose stool 24 Unknown History
melatonin 3 mg capsule 3 mg PO QHS 24 Unknown History
mirtazapine 30 mg tablet 45 mg PO QHS 24 Unknown History
mirtazapine 45 mg tablet 45 mg PO QHS 24 Unknown History




Allergy/AdvReac Type Severity Reaction Status Date / Time
carbamazepine Allergy Intermediate PT UNSURE Verified 24 13:16
   OF REACTION  


Surgical History (Reviewed 24 @ 16:01 by Dr. Evangelista Carvajal MD)

History of hip surgery


Social History (Reviewed 24 @ 16:01 by Dr. Evangelista Carvajal MD)
Smoking Status:  Former smoker 



Vital Signs
Vital Signs
Vital Signs: 


 24
14:04
Pulse Rate 105 H
Respiratory Rate 16
Blood Pressure 180/77 H
Blood Pressure Mean 111
Blood Pressure Source Monitor
Blood Pressure Position Semi-Fowlers
Blood Pressure Location Left Arm
Oxygen Delivery Method Room Air

Weight

Weight:                        107 lb                                            
Body Mass Index (BMI)          19.5                                              




Physical Exam
Const
alert, no apparent distress, average body habitus and well nourished
Constitutional Narrative: 
The patient is alert and interactive, though confused and unable to state her age and responds inappropriately to routine questions.  The patient is of relatively normal body habitus, with a BMI of 19.5.
General Appearance: cooperative, comfortable, well kempt and well developed
Orientation / Consciousness: awake, confused and disoriented
HEENT
normocephalic and head/scalp atraumatic
Head and Scalp: normal to inspection, normocephalic and atraumatic
Face and Sinus: normal facial exam
External Ear: external ears normal
Eyes
EOMs intact bilaterally
General Eye: normal appearance of both eyes
Resp
normal respiratory effort, normal air movement, no retractions and no use of accessory muscles
Effort and Inspection: able to speak in complete sentences
Extremity
no calf tenderness
General Extremity: Negative for clubbing or cyanosis
Skin
Wound Narrative: 
An ulceration is noted on the patient's right posterior calf.  It is pink and healthy in appearance, with no sign of infection or cellulitis.  There is a small amount of bioburden.  Dimensions are documented elsewhere.  The ulceration appears to be 
smaller than that noted previously.  There is no significant swelling or edema noted in the patient's lower extremities at this time.  
Neuro
CN's II-XII intact bilaterally, moves all extremities and no focal motor deficits
Sensorium / Orientation: awake, alert, orientation impaired and confused
Psych
Appearance: grossly normal and appropriate
Attitude: calm
Activity / Motor Behavior: appropriate eye contact
Speech: normal speech
Mood & Affect: euthymic mood
Thought Process: normal thought process
Thought Content: normal thought content
Attention / Concentration: attention grossly intact

Debridement Note
Debridement Note
Wound debrided: Right posterior calf
Laterality: Right
Type of Debridement: Excisional debridement
Anesthesia Used: 5% Lidocaine Gel
Depth: Down to and including healthy tissue and in the subcutaneous layer
Percentage of wound debrided: 100
Instrument Used: 3mm curette
Tissue Removed: Bioburden and senescent material
Severity: Fat Layer Exposed
Amount of bleeding with debridement: Mild
Bleeding Controlled with: Compression and gauze
Patient tolerated procedure: Patient tolerated procedure well
Post-Debridement Measurements and Additional Note: 
Post-Debridement Measurements/Treatment

 - Nurse 1 - General Ulcer Assessment               Start:  24 13:20
Freq:                                                 Status: Active        
Protocol:  WC.LOWEXT                                                        
Activity Type Activity Date Activity User E-sign Co-sign Detail
Recorded Client Recorded Date Recorded By   
Document 24 13:20 KW   
wound center 24 13:41 KW   
Document 24 11:58 RB   
wound center 24 12:00 RB   
Document 24 14:53 KW   
wound center 24 15:07 KW   
Document 24 10:29 RB   
wound 24 10:32 RB   
Document 24 14:04    
[id number] 24 14:11    

  24
  13:20 11:58 14:53
 - Today's Visit Information   
Type of service Initial Visit Follow-up Visit Follow-up Visit
  (Physician/CNP (Physician/CNP
  ) )
Arrival Mode Ambulatory, Ambulatory, Ambulatory,
 Walker Walker Walker
Transfer Assistance  None 
Accompanied by CARE GIVER  
Patient Identification Verified (Name & Yes Yes Yes
)   
Patient Requires Transmission-Based  No 
Precautions   
Height and Weight   
Height 5 ft 2 in  
Weight 107 lb  
Weight in Pounds 107.0 lbs  
Body Mass Index (BMI) 19.5 19.5 19.5
BMI Classification Normal Normal Normal
BSA - Darrell 1.47  
Vital Signs   
Temperature (97.8 F-99.1 F) 97.7 F L 97.7 F L 96.1 F L
Temperature Source Temporal Temporal Temporal
Pulse Rate () 79 73 76
Pulse Location Monitor Monitor Monitor
Respiratory Rate (12-18) 18 18 18
Respiratory rate source Observation Observation Observation
Oxygen Delivery Method Room Air  Room Air
Blood Pressure (90//80) 148/57 H 150/69 H 151/61 H
Blood Pressure Mean 87 96 91
Source Monitor Monitor Monitor
Position Semi-Fowlers Semi-Fowlers Semi-Fowlers
Blood Pressure Location Left Arm Left Arm Left Arm
History Since Last Visit- (Skip if this   
is Patient's initial visit)   
Have you changed medications since your  No No
last visit?   
Any new allergies or adverse reactions  No No
Had a fall/change in ADL's that may  No No
increase risk of falls   
Signs or symptoms of abuse and/or  No No
neglect since last visit   
Have you been in the hospital since your  No No
last visit?   
Has dressing in place as prescribed  Yes Yes
Has compression in place as prescribed  Yes Yes
Has offloadiing in place as prescribed  No N/A
Experienced any changes in pain level or  No No
management   
Left Footwear Regular Shoe  Regular Shoe
Right Footwear Regular Shoe  Regular Shoe
Pain Scale: 0-10 Numeric   
Is Patient Pain Free? Yes Yes Yes
Lower Extremity Assessment/ Foot   
Assessment/ Toe Nail Assessment   
Right   
 -Posterior Tibial Doppler Multiphasic  
 -Dorsalis Pedis Doppler Multiphasic  
 -Extremity Color Red  
 -Hair Growth on Legs Yes  
 -Hair Growth on Toes No  
 -Temperature of Extremity Warm  
 -Capillary Refill Less than 3  
 Seconds  
 -Thick No  
 -Discolored No  
 -Deformed No  
 -Improper Length & Hygeine No  
Left   
 -Posterior Tibial Doppler Multiphasic  
 -Dorsalis Pedis Doppler Monophasic  
 -Hair Growth on Legs Yes  
 -Hair Growth on Toes No  
 -Temperature of Extremity Cool  
 -Thick No  
 -Discolored No  
 -Deformed No  
 -Improper Length & Hygeine No  
Communication Assessment   
Preferred language English  
 Required No  
Able to Read Yes  
Able to Write Yes  
Communication Tools None  
Caregiver Communication Skills No Impairment  
Impairment   
Right Hearing Abillity Normal  
Left Hearing Abillity Normal  
Visual Assistive Devices Glasses  
Teaching Assessment   
Preferences Verbal,Written,  
 Demonstration  
Barriers to Learning None  
Readiness To Learn Excellent  
Willingness to Engage in Self Management High  
Activies   
Readiness to Engage in Self Management High  
Activities   
Anxiety Level Calm  
Cooperation Cooperative  
Perception Coherent  
Interest in Health Problem Asks Questions  
Education Importance Acknowledges  
 Need  
Does Patient Smoke tobacco or other Yes  
substances   
Smoking Status Former smoker  
Is Patient Diabetic No  
Functional Assessment   
Recent Decline in Ability to Perform Ambulation,  
 Toileting  
Culture/Pentecostal/   
Cultural/Pentecostal Needs that may affect No  
Treatment Plan   
Would you allow our hospital  to No  
meet you for the purpose of spiritual/   
emotional support?   
 to contact place of Judaism No  
Teaching: Wound Center   
Compression Wraps & Stockings   
 -Person Taught  Patient 
 -Teaching Method  Discussion 
 -Response to teaching  Verbalize 
  understanding 

  24
  10:29 14:04
WC - Today's Visit Information  
Type of service Nurse-only Follow-up Visit
 Visit (Physician/CNP
  )
Arrival Mode Wheelchair Ambulatory,
  Walker
Transfer Assistance None 
Accompanied by  
Patient Identification Verified (Name & Yes Yes
)  
Patient Requires Transmission-Based No 
Precautions  
Height and Weight  
Height  
Weight  
Weight in Pounds  
Body Mass Index (BMI) 19.5 19.5
BMI Classification Normal Normal
BSA - Darrell  
Vital Signs  
Temperature (97.8 F-99.1 F) 97.7 F L 
Temperature Source Temporal 
Pulse Rate () 85 105 H
Pulse Location Monitor Monitor
Respiratory Rate (12-18) 18 16
Respiratory rate source Observation Observation
Oxygen Delivery Method  Room Air
Blood Pressure (90//80) 143/71 H 180/77 H
Blood Pressure Mean 95 111
Source Monitor Monitor
Position Semi-Fowlers Semi-Fowlers
Blood Pressure Location Left Arm Left Arm
History Since Last Visit- (Skip if this  
is Patient's initial visit)  
Have you changed medications since your No No
last visit?  
Any new allergies or adverse reactions No No
Had a fall/change in ADL's that may No No
increase risk of falls  
Signs or symptoms of abuse and/or No No
neglect since last visit  
Have you been in the hospital since your No No
last visit?  
Has dressing in place as prescribed Yes Yes
Has compression in place as prescribed Yes Yes
Has offloadiing in place as prescribed No N/A
Experienced any changes in pain level or No No
management  
Left Footwear  Regular Shoe
Right Footwear  Regular Shoe
Pain Scale: 0-10 Numeric  
Is Patient Pain Free? Yes Yes
Lower Extremity Assessment/ Foot  
Assessment/ Toe Nail Assessment  
Right  
 -Posterior Tibial Doppler  
 -Dorsalis Pedis Doppler  
 -Extremity Color  
 -Hair Growth on Legs  
 -Hair Growth on Toes  
 -Temperature of Extremity  
 -Capillary Refill  
 -Thick  
 -Discolored  
 -Deformed  
 -Improper Length & Hygeine  
Left  
 -Posterior Tibial Doppler  
 -Dorsalis Pedis Doppler  
 -Hair Growth on Legs  
 -Hair Growth on Toes  
 -Temperature of Extremity  
 -Thick  
 -Discolored  
 -Deformed  
 -Improper Length & Hygeine  
Communication Assessment  
Preferred language  
 Required  
Able to Read  
Able to Write  
Communication Tools  
Caregiver Communication Skills  
Impairment  
Right Hearing Abillity  
Left Hearing Abillity  
Visual Assistive Devices  
Teaching Assessment  
Preferences  
Barriers to Learning  
Readiness To Learn  
Willingness to Engage in Self Management  
Activies  
Readiness to Engage in Self Management  
Activities  
Anxiety Level  
Cooperation  
Perception  
Interest in Health Problem  
Education Importance  
Does Patient Smoke tobacco or other  
substances  
Smoking Status  
Is Patient Diabetic  
Functional Assessment  
Recent Decline in Ability to Perform  
Culture/Pentecostal/  
Cultural/Pentecostal Needs that may affect  
Treatment Plan  
Would you allow our [facility]  to  
meet you for the purpose of spiritual/  
emotional support?  
 to contact place of Judaism  
Teaching: Wound Center  
Compression Wraps & Stockings  
 -Person Taught  
 -Teaching Method  
 -Response to teaching  

WC - Nurse 1 - General Ulcer Measurement              Start:  24 13:20
Freq:                                                 Status: Active        
Protocol:                                                                   
Activity Type Activity Date Activity User E-sign Co-sign Detail
Recorded Client Recorded Date Recorded By   
Document 24 13:20    
wound center 24 13:41 KW   
Document 24 11:58 RB   
wound center 24 12:00 RB   
Document 24 14:53 KW   
wound center 24 15:07 KW   
Document 24 10:29 RB   
wound 24 10:32 RB   
Document 24 14:04    
[id number] 24 14:11    

  24
  13:20 11:58 14:53
Wound Center Nurse 1   
#1 RT POST LE   
 -Current Size (cm) - Length 1.2  1.3
 -Current Size (cm) - Width 0.7  0.4
 -Current Size (cm) - Depth 0.1  0.1
 -Total Square Cm 0.84  0.52
 -Date of Last Picture (Recall this 24  
field)   
 -Tunneling  No 
 -Undermining/Tunneling  No 
 -Classification - Thickness  Partial 
  Thickness 
 -Exudate Amt Small Medium Small
 -Exudate Type Serosanguineous Serosanguineous Serosanguineous
 -Wound Margin Distinct, Distinct, Distinct,
 Outline Outline Outline
 Attached Attached Attached
 -Granulation Amt Small (1-33%) Medium (34-66%) Large (%)
 -Granulation Quality Red Pink Red
 -Slough/Fibrin  Yes 
 -Necrosis Amt Large (%) Medium (34-66%) 
 -Necrotic Tissue Type Adherent Slough Adherent Slough 
 -Structure Exposed  N/A 
 -Texture (Natalie-wound Skin Appearance) Assessed Assessed Assessed
 -Moisture (Natalie-wound Skin Appearance) Assessed Assessed Assessed
 -Color (Natalie-wound Skin Appearance) Assessed, Assessed Assessed
 Erythema  
 -Temperature (Natalie-wound Skin No Abnormality No Abnormality No Abnormality
Appearance) (Pt Warm) (Pt Warm) (Pt Warm)
 -Tenderness on Palpation (Natalie-wound No No No
Skin Appearance)   
 -Ulcer Cleansing Rinsed/ Wound Cleanser Soap and Water
 Irrigated with  
 Saline  
 -Foul Odor after Cleansing No No No
 -Anesthetic Used 5% Lidocaine  5% Lidocaine
 Gel  Gel
Lower Limb Edema Present  Yes 
Right Calf (cm) 31.6 29.5 29
Right Ankle (cm) 21.4 19.5 20
Left Calf (cm) 30.6  
Left Ankle (cm) 20.2  

  24
  10:29 14:04
Wound Center Nurse 1  
#1 RT POST LE  
 -Current Size (cm) - Length  0.1
 -Current Size (cm) - Width  0.1
 -Current Size (cm) - Depth  0.1
 -Total Square Cm  0.01
 -Date of Last Picture (Recall this  
field)  
 -Tunneling  
 -Undermining/Tunneling  
 -Classification - Thickness  
 -Exudate Amt Small Small
 -Exudate Type Serosanguineous Serosanguineous
 -Wound Margin  Distinct,
  Outline
  Attached
 -Granulation Amt  Large (%)
 -Granulation Quality  Red
 -Slough/Fibrin  
 -Necrosis Amt  
 -Necrotic Tissue Type  
 -Structure Exposed  
 -Texture (Natalie-wound Skin Appearance)  Assessed
 -Moisture (Natalie-wound Skin Appearance)  Assessed
 -Color (Natalie-wound Skin Appearance)  Assessed
 -Temperature (Natalie-wound Skin  No Abnormality
Appearance)  (Pt Warm)
 -Tenderness on Palpation (Natalie-wound  No
Skin Appearance)  
 -Ulcer Cleansing  Soap and Water
 -Foul Odor after Cleansing  No
 -Anesthetic Used  5% Lidocaine
  Gel
Lower Limb Edema Present Yes 
Right Calf (cm) 30 28.6
Right Ankle (cm) 19.5 19.7
Left Calf (cm)  
Left Ankle (cm)  

WC - Nurse 2 - General Ulcer CM Notes                 Start:  24 13:20
Freq:                                                 Status: Active        
Protocol:                                                                   
Activity Type Activity Date Activity User E-sign Co-sign Detail
Recorded Client Recorded Date Recorded By   
Document 24 14:18 DS   
[id number] 24 14:22 DS   
Document 24 15:32 DS   
1 24 15:33 DS   
Document 24 14:27 DS   
1 24 14:30 DS   

  24
  14:18 15:32 14:27
Wound Center Nurse 2   
#1 RT POST LE   
 -Time 14:15 15:30 14:27
 -Correct Patient Yes Yes Yes
 -Correct Side, Site, Position Yes Yes Yes
 -Correct Procedure Yes Yes Yes
 -Procedure Performed Yes Yes Yes
 -Type of Procedure Debridement Debridement Debridement
 -Clinical Debridement Subcutaneous Subcutaneous Subcutaneous
 -Tissue Removed Subcutaneous Subcutaneous Subcutaneous
 -Post Debridement (cm) - Length 1.4 1 0.6
 -Post Debridement (cm) - Width 1.5 0.8 0.4
 -Post Debridement (cm) - Depth 0.1 0.1 0.1
 -Total Square (Post) (cm) 2.10 0.8 0.24
 -Area of Debridement (cm) - Length 1.4 1.0 0.6
 -Area of Debridement (cm) - Width 1.5 0.8 0.4
 -Total Square (Area) (cm) 2.10 0.80 0.24
 -Tunneling No No No
 -Undermining/Tunneling No No No
 -Circular Undermining No No No
 -Wound/Ulcer Outcome Not Healed Not Healed Not Healed
 -Ulcer Cleansing Rinsed/ Rinsed/ Rinsed/
 Irrigated with Irrigated with Irrigated with
 Saline Saline Saline
 -Bleeding Controlled with Pressure Pressure Pressure
 -Treatment Response Procedure Procedure Procedure
 Tolerated Well Tolerated Well Tolerated Well
 -Debridement - Subq, 1st 20sq cm Yes Yes Yes
Pain Scale: 0-10 Numeric   
Is Patient Pain Free? Yes Yes Yes

WC - Nurse 3 - General Ulcer D/C NN                   Start:  24 13:20
Freq:                                                 Status: Active        
Protocol:                                                                   
Activity Type Activity Date Activity User E-sign Co-sign Detail
Recorded Client Recorded Date Recorded By   
Document 24 11:58 RB   
wound center 24 12:00 RB   
Document 24 15:43 KW   
wound center 24 15:44 KW   
Document 24 10:29 RB   
wound 24 10:32 RB   
Document 24 14:58 RB   
[id number] 24 14:58 RB   

  24
  11:58 15:43 10:29
Vital Signs   
Temperature (97.8 F-99.1 F) 97.7 F L  97.7 F L
Temperature Source Temporal  Temporal
Pulse Rate () 73  85
Pulse Location Monitor  Monitor
Respiratory Rate (12-18) 18  18
Respiratory rate source Observation  Observation
Blood Pressure (90//80) 150/69 H  143/71 H
Blood Pressure Mean 96  95
Source Monitor  Monitor
Position Semi-Fowlers  Semi-Fowlers
Blood Pressure Location Left Arm  Left Arm
Pain Scale: 0-10 Numeric   
Is Patient Pain Free? Yes Yes Yes
Teaching: Wound Center   
Compression Wraps & Stockings   
 -Person Taught Patient  
 -Teaching Method Discussion  
 -Response to teaching Verbalize  
 understanding  
Wound Care Center Nurse 3   
#1 RT POST LE   
 -Ulcer Cleansing  Rinsed/ Wound Cleanser
  Irrigated with 
  Saline 
 -Primary Dressing Applied Promogran Promogran Promogran
 Miryam Matter Miryam Matter Miryam Matter
 -Primary Dressing Covered/Secured with Dry Gauze Dry Gauze & Dry Gauze
  Roll Gauze 
 -Promogran Miryam Matter 1 1 1
Right   
 -Multi-Layered Wrap Application Multi-Layer Multi-Layer Multi-Layer
 Comp - Right ($ Comp - Right ($ Comp - Right ($
 ) ) )
Treatment Response Procedure  Procedure
 Tolerated Well  Tolerated Well
WC - Visit Discharge   
Discharge Condition Stable Stable Stable
Ambulatory Status Ambulatory, Ambulatory, Wheelchair
 Walker Walker 
Transportation Private Auto Private Auto NH transport
Medication Reconcilliation completed & No No No
provided to patient/care provider   
Clinical Summary of Care Provided Yes Yes No

  24
  14:58
Vital Signs 
Temperature (97.8 F-99.1 F) 
Temperature Source 
Pulse Rate () 
Pulse Location 
Respiratory Rate (12-18) 
Respiratory rate source 
Blood Pressure (90//80) 
Blood Pressure Mean 
Source 
Position 
Blood Pressure Location 
Pain Scale: 0-10 Numeric 
Is Patient Pain Free? Yes
Teaching: Wound Center 
Compression Wraps & Stockings 
 -Person Taught 
 -Teaching Method 
 -Response to teaching 
Wound Care Center Nurse 3 
#1 RT POST LE 
 -Ulcer Cleansing Wound Cleanser
 -Primary Dressing Applied Promogran
 Miryam Matter
 -Primary Dressing Covered/Secured with Dry Gauze
 -Promogran Miryam Matter 1
Right 
 -Multi-Layered Wrap Application Multi-Layer
 Comp - Right ($
 )
Treatment Response Procedure
 Tolerated Well
WC - Visit Discharge 
Discharge Condition Stable
Ambulatory Status Ambulatory,
 Walker
Transportation Private Auto
Medication Reconcilliation completed & No
provided to patient/care provider 
Clinical Summary of Care Provided Yes




Charges/Coding
Procedures Integumentary
[id number]: 40315 May subq tissue 20 sq cm/<

Assessment/Plan
Assessment/Plan
(1) Non-pressure chronic ulcer of calf: 
CODE(S):       L97.209 - Non-pressure chronic ulcer of unspecified calf with unspecified severity
QUALIFIERS:               Laterality: right  Non-pressure ulcer stage: with fat layer exposed  Qualified Code(s): L97.212 - Non-pressure chronic ulcer of right calf with fat layer exposed
(2) Non-pressure chronic ulcer of lower leg: 
CODE(S):       L97.909 - Non-pressure chronic ulcer of unspecified part of unspecified lower leg with unspecified severity
QUALIFIERS:               Laterality: right  Non-pressure ulcer stage: with fat layer exposed  Qualified Code(s): L97.912 - Non-pressure chronic ulcer of unspecified part of right lower leg with fat layer exposed
(3) Wound infection: 
CODE(S):       T14.8XXA - Other injury of unspecified body region, initial encounter; L08.9 - Local infection of the skin and subcutaneous tissue, unspecified
(4) Leg swelling: 
CODE(S):       M79.89 - Other specified soft tissue disorders
(5) Leg edema: 
CODE(S):       R60.0 - Localized edema
(6) Bipolar disorder: 
CODE(S):       F31.9 - Bipolar disorder, unspecified
(7) Depression: 
CODE(S):       F32.A - Depression, unspecified
(8) Dementia: 
CODE(S):       F03.90 - Unspecified dementia, unspecified severity, without behavioral disturbance, psychotic disturbance, mood disturbance, and anxiety
(9) Urinary incontinence: 
CODE(S):       R32 - Unspecified urinary incontinence
(10) Hypertension: 
CODE(S):       I10 - Essential (primary) hypertension
(11) Hypothyroidism: 
CODE(S):       E03.9 - Hypothyroidism, unspecified
(12) History of hip surgery: 
CODE(S):       Z98.890 - Other specified postprocedural states
PLAN: 
Plan
This is a 73-year-old female who presented with an ulceration on the right posterior calf, age-indeterminate.  The patient is a poor historian.  She suffers from dementia, bipolar disorder, and depression.  The etiology of the patient's ulceration 
is uncertain.  The ulceration appears generally healthy and well-vascularized.  There is no sign of infection or cellulitis at this time.  The ulceration has decreased in size since the patient's last visit.  We are to continue the use of moistened 
Miryam topically.  The patient's right lower extremity is to be wrapped with a 3M 2 layer compression wrap, which will stay in place undisturbed for several days.  This is intended to prevent the patient from  picking  or disturbing the ulcer site.  
The 2 layer compression wrap is to be changed twice weekly.  The patient is to return in 1 week for reassessment.

Total time: 22 minutes

## 2024-06-25 ENCOUNTER — HOSPITAL ENCOUNTER (OUTPATIENT)
Dept: HOSPITAL 100 - WC | Age: 74
LOS: 5 days | Discharge: HOME | End: 2024-06-30
Payer: MEDICARE

## 2024-06-25 VITALS — BODY MASS INDEX: 19.5 KG/M2

## 2024-06-25 VITALS
HEART RATE: 92 BPM | TEMPERATURE: 96 F | SYSTOLIC BLOOD PRESSURE: 154 MMHG | DIASTOLIC BLOOD PRESSURE: 57 MMHG | RESPIRATION RATE: 18 BRPM

## 2024-06-25 DIAGNOSIS — L08.9: ICD-10-CM

## 2024-06-25 DIAGNOSIS — Z98.890: ICD-10-CM

## 2024-06-25 DIAGNOSIS — I10: ICD-10-CM

## 2024-06-25 DIAGNOSIS — F41.9: ICD-10-CM

## 2024-06-25 DIAGNOSIS — R60.0: ICD-10-CM

## 2024-06-25 DIAGNOSIS — Z79.82: ICD-10-CM

## 2024-06-25 DIAGNOSIS — M79.89: ICD-10-CM

## 2024-06-25 DIAGNOSIS — Z87.891: ICD-10-CM

## 2024-06-25 DIAGNOSIS — E03.9: ICD-10-CM

## 2024-06-25 DIAGNOSIS — T14.8XXA: ICD-10-CM

## 2024-06-25 DIAGNOSIS — F03.90: ICD-10-CM

## 2024-06-25 DIAGNOSIS — F31.9: ICD-10-CM

## 2024-06-25 DIAGNOSIS — R32: ICD-10-CM

## 2024-06-25 DIAGNOSIS — L97.212: Primary | ICD-10-CM

## 2024-06-25 PROCEDURE — G0463 HOSPITAL OUTPT CLINIC VISIT: HCPCS

## 2024-06-25 PROCEDURE — 11042 DBRDMT SUBQ TIS 1ST 20SQCM/<: CPT

## 2024-06-25 PROCEDURE — 99204 OFFICE O/P NEW MOD 45 MIN: CPT

## 2024-06-25 PROCEDURE — 29581 APPL MULTLAYER CMPRN SYS LEG: CPT

## 2024-06-25 NOTE — HP.PCM_ITS
History of Present Illness    
Date of Service: 24    
Chief Complaint: Ulceration of the right posterior calf    
History of Wound: This is a 73-year-old female who is a resident of [facility].  She suffers from bipolar disorder, depression, and   
dementia.  She is a poor historian.  She presented with an ulceration on the   
right posterior calf, age indeterminate.  The patient is known to be a   .  
 She has been advised in the past to wear compression stockings, but she refuse  
s, and is not compliant.  She has recently been prescribed cefadroxil 500 mg   
p.o. twice daily, for a 10-day course.  This was prescribed as a result of   
cultures of her ulceration which were performed on May 24, 2024, which revealed   
the presence of Staph aureus.  It is said that the patient suffers from swelling  
and edema in her lower extremities.  She sleeps on a flat mattress at night.    
However, she is not very active, and spends a good deal of each day sitting.    
She requires a walker for ambulation.  She has a history of hypertension and   
hypothyroidism.  She otherwise denies a history of myocardial infarction,   
cerebrovascular accident, diabetes mellitus, renal disease, pulmonary disease,   
and hyperlipidemia.  The patient is of relatively normal body habitus, with a   
BMI of 19.5.     
     
    
    
[facility]    
Medical History (Reviewed 24 @ 15:25 by Dr. Evangelista Carvajal MD)    
    
Non-pressure chronic ulcer of calf    
Wound infection    
Leg edema    
Leg swelling    
Hypothyroidism    
Hypertension    
Urinary incontinence    
Dementia    
Depression    
Bipolar disorder    
Non-pressure chronic ulcer of lower leg    
    
    
                                Home Medications    
    
    
    
?Medication ?Instructions ?Recorded ?Last Taken ?Type    
     
Depakote 625 cap PO QHS 24 Unknown History    
     
aspirin 81 mg capsule 81 mg PO DAILY 24 Unknown History    
     
cholecalciferol (vitamin D3) 10 25 mcg PO DAILY 24 Unknown History    
    
mcg/mL (400 unit/mL) oral drops        
    
(Pedia D-Konstantin)        
     
donepezil 10 mg tablet 5 mg PO DAILY 24 Unknown History    
     
donepezil 5 mg tablet (Aricept) 5 mg PO QHS 24 Unknown History    
     
hydroxyzine HCl 25 mg tablet 25 mg PO Q8H PRN anxiety 24 Unknown History    
     
levothyroxine 50 mcg tablet 50 mcg PO DAILY 24 Unknown History    
     
loperamide 2 mg capsule 2 mg PO Q6H PRN loose stool 24 Unknown History    
     
melatonin 3 mg capsule 3 mg PO QHS 24 Unknown History    
     
mirtazapine 30 mg tablet 45 mg PO QHS 24 Unknown History    
     
mirtazapine 45 mg tablet 45 mg PO QHS 24 Unknown History    
    
    
    
                                            
    
    
    
Allergy/AdvReac Type Severity Reaction Status Date / Time    
     
carbamazepine Allergy Intermediate PT UNSURE Verified 24 13:16    
    
   OF REACTION      
    
    
    
Surgical History (Reviewed 24 @ 15:25 by Dr. Evangelista Carvajal MD)    
    
History of hip surgery    
    
    
Social History (Reviewed 24 @ 15:25 by Dr. Evangelista Carvajal MD)    
Smoking Status:  Former smoker     
    
    
    
Vital Signs    
Vital Signs    
Vital Signs:     
                                            
    
    
    
 24    
14:47    
     
Temperature 96.0 F L    
     
Temperature Source Temporal    
     
Pulse Rate 92    
     
Respiratory Rate 18    
     
Blood Pressure 154/57 H    
     
Blood Pressure Mean 89    
     
Blood Pressure Source Monitor    
     
Blood Pressure Position Sitting    
     
Blood Pressure Location Left Arm    
     
Oxygen Delivery Method Room Air    
    
    
                                     Weight    
    
    
    
Weight:                        107 lb                                             
    
     
Body Mass Index (BMI)          19.5                                               
    
    
    
    
    
    
Physical Exam    
Const    
alert, no apparent distress, average body habitus and well nourished    
Constitutional Narrative:     
The patient is alert and interactive, though confused and unable to state her   
age and responds inappropriately to routine questions.  The patient is of   
relatively normal body habitus, with a BMI of 19.5.    
General Appearance: cooperative, comfortable, well kempt and well developed    
Orientation / Consciousness: awake, confused and disoriented    
HEENT    
normocephalic and head/scalp atraumatic    
Head and Scalp: normal to inspection, normocephalic and atraumatic    
Face and Sinus: normal facial exam    
External Ear: external ears normal    
Eyes    
EOMs intact bilaterally    
General Eye: normal appearance of both eyes    
Resp    
normal respiratory effort, normal air movement, no retractions and no use of   
accessory muscles    
Effort and Inspection: able to speak in complete sentences    
Extremity    
no calf tenderness    
General Extremity: Negative for clubbing or cyanosis    
Skin    
Wound Narrative:     
An ulceration is noted on the patient's right posterior calf.  It is pink and   
healthy in appearance, with no sign of infection or cellulitis.  There is a   
small amount of bioburden.  Dimensions are documented elsewhere.  The ulceration  
is significantly smaller than the patient's prior visit.  There is no   
significant swelling or edema noted in the patient's lower extremities at this   
time.      
Neuro    
CN's II-XII intact bilaterally, moves all extremities and no focal motor   
deficits    
Sensorium / Orientation: awake, alert, orientation impaired and confused    
Psych    
Appearance: grossly normal and appropriate    
Attitude: calm    
Activity / Motor Behavior: appropriate eye contact    
Speech: normal speech    
Mood & Affect: euthymic mood    
Thought Process: normal thought process    
Thought Content: normal thought content    
Attention / Concentration: attention grossly intact    
    
Debridement Note    
Debridement Note    
Wound debrided: Right posterior calf    
Laterality: Right    
Type of Debridement: Excisional debridement    
Anesthesia Used: 5% Lidocaine Gel    
Depth: Down to and including healthy tissue and in the subcutaneous layer    
Percentage of wound debrided: 100    
Instrument Used: 3mm curette    
Tissue Removed: Bioburden and senescent material    
Severity: Fat Layer Exposed    
Amount of bleeding with debridement: Mild    
Bleeding Controlled with: Compression and gauze    
Patient tolerated procedure: Patient tolerated procedure well    
Post-Debridement Measurements and Additional Note:     
Post-Debridement Measurements/Treatment    
    
 - Nurse 1 - General Ulcer Assessment               Start:  24 13:20    
Freq:                                                 Status: Active            
Protocol:  .MIO                                                            
    
    
Activity Type Activity Date Activity User E-sign Co-sign Detail    
    
Recorded Client Recorded Date Recorded By      
     
Document 24 13:20 KW       
    
wound center 24 13:41 KW       
     
Document 24 11:58 RB       
    
wound center 24 12:00 RB       
     
Document 24 14:53 KW       
    
wound center 24 15:07 KW       
     
Document 24 10:29 RB       
    
wound 24 10:32 RB       
     
Document 24 14:04        
    
[id number] 24 14:11        
     
Document 24 14:47 KW       
    
h 24 14:58 KW       
    
    
    
    
  24    
    
  13:20 11:58 14:53    
     
 - Today's Visit Information       
     
Type of service Initial Visit Follow-up Visit Follow-up Visit    
    
  (Physician/CNP (Physician/CNP    
    
  ) )    
     
Arrival Mode Ambulatory, Ambulatory, Ambulatory,    
    
 Walker Walker Walker    
     
Transfer Assistance  None     
     
Accompanied by CARE GIVER      
     
Patient Identification Verified (Name & Yes Yes Yes    
    
)       
     
Patient Requires Transmission-Based  No     
    
Precautions       
     
Height and Weight       
     
Height 5 ft 2 in      
     
Weight 107 lb      
     
Weight in Pounds 107.0 lbs      
     
Body Mass Index (BMI) 19.5 19.5 19.5    
     
BMI Classification Normal Normal Normal    
     
BSA - Darrell 1.47      
     
Vital Signs       
     
Temperature (97.8 F-99.1 F) 97.7 F L 97.7 F L 96.1 F L    
     
Temperature Source Temporal Temporal Temporal    
     
Pulse Rate () 79 73 76    
     
Pulse Location Monitor Monitor Monitor    
     
Respiratory Rate (12-18) 18 18 18    
     
Respiratory rate source Observation Observation Observation    
     
Oxygen Delivery Method Room Air  Room Air    
     
Blood Pressure (90//80) 148/57 H 150/69 H 151/61 H    
     
Blood Pressure Mean 87 96 91    
     
Source Monitor Monitor Monitor    
     
Position Semi-Fowlers Semi-Fowlers Semi-Fowlers    
     
Blood Pressure Location Left Arm Left Arm Left Arm    
     
History Since Last Visit- (Skip if this       
    
is Patient's initial visit)       
     
Have you changed medications since your  No No    
    
last visit?       
     
Any new allergies or adverse reactions  No No    
     
Had a fall/change in ADL's that may  No No    
    
increase risk of falls       
     
Signs or symptoms of abuse and/or  No No    
    
neglect since last visit       
     
Have you been in the hospital since your  No No    
    
last visit?       
     
Has dressing in place as prescribed  Yes Yes    
     
Has compression in place as prescribed  Yes Yes    
     
Has offloadiing in place as prescribed  No N/A    
     
Experienced any changes in pain level or  No No    
    
management       
     
Left Footwear Regular Shoe  Regular Shoe    
     
Right Footwear Regular Shoe  Regular Shoe    
     
Pain Scale: 0-10 Numeric       
     
Is Patient Pain Free? Yes Yes Yes    
     
Lower Extremity Assessment/ Foot       
    
Assessment/ Toe Nail Assessment       
     
Right       
     
 -Posterior Tibial Doppler Multiphasic      
     
 -Dorsalis Pedis Doppler Multiphasic      
     
 -Extremity Color Red      
     
 -Hair Growth on Legs Yes      
     
 -Hair Growth on Toes No      
     
 -Temperature of Extremity Warm      
     
 -Capillary Refill Less than 3      
    
 Seconds      
     
 -Thick No      
     
 -Discolored No      
     
 -Deformed No      
     
 -Improper Length & Hygeine No      
     
Left       
     
 -Posterior Tibial Doppler Multiphasic      
     
 -Dorsalis Pedis Doppler Monophasic      
     
 -Hair Growth on Legs Yes      
     
 -Hair Growth on Toes No      
     
 -Temperature of Extremity Cool      
     
 -Thick No      
     
 -Discolored No      
     
 -Deformed No      
     
 -Improper Length & Hygeine No      
     
Communication Assessment       
     
Preferred language English      
     
 Required No      
     
Able to Read Yes      
     
Able to Write Yes      
     
Communication Tools None      
     
Caregiver Communication Skills No Impairment      
    
Impairment       
     
Right Hearing Abillity Normal      
     
Left Hearing Abillity Normal      
     
Visual Assistive Devices Glasses      
     
Teaching Assessment       
     
Preferences Verbal,Written,      
    
 Demonstration      
     
Barriers to Learning None      
     
Readiness To Learn Excellent      
     
Willingness to Engage in Self Management High      
    
Activies       
     
Readiness to Engage in Self Management High      
    
Activities       
     
Anxiety Level Calm      
     
Cooperation Cooperative      
     
Perception Coherent      
     
Interest in Health Problem Asks Questions      
     
Education Importance Acknowledges      
    
 Need      
     
Does Patient Smoke tobacco or other Yes      
    
substances       
     
Smoking Status Former smoker      
     
Is Patient Diabetic No      
     
Functional Assessment       
     
Recent Decline in Ability to Perform Ambulation,      
    
 Toileting      
     
Culture/Yarsani/       
     
Cultural/Yarsani Needs that may affect No      
    
Treatment Plan       
     
Would you allow our hospital  to No      
    
meet you for the purpose of spiritual/       
    
emotional support?       
     
 to contact place of [facility]ae[facility] No      
     
Teaching: Wound Center       
     
Compression Wraps & Stockings       
     
 -Person Taught  Patient     
     
 -Teaching Method  Discussion     
     
 -Response to teaching  Verbalize     
    
  understanding     
    
    
    
    
  24    
    
  10:29 14:04 14:47    
     
WC - Today's Visit Information       
     
Type of service Nurse-only Follow-up Visit Follow-up Visit    
    
 Visit (Physician/CNP (Physician/CNP    
    
  ) )    
     
Arrival Mode Wheelchair Ambulatory, Walker    
    
  Walker     
     
Transfer Assistance None      
     
Accompanied by       
     
Patient Identification Verified (Name & Yes Yes     
    
)       
     
Patient Requires Transmission-Based No      
    
Precautions       
     
Height and Weight       
     
Height       
     
Weight       
     
Weight in Pounds       
     
Body Mass Index (BMI) 19.5 19.5 19.5    
     
BMI Classification Normal Normal Normal    
     
BSA - Darrell       
     
Vital Signs       
     
Temperature (97.8 F-99.1 F) 97.7 F L  96.0 F L    
     
Temperature Source Temporal  Temporal    
     
Pulse Rate () 85 105 H 92    
     
Pulse Location Monitor Monitor Monitor    
     
Respiratory Rate (12-18) 18 16 18    
     
Respiratory rate source Observation Observation Observation    
     
Oxygen Delivery Method  Room Air Room Air    
     
Blood Pressure (90//80) 143/71 H 180/77 H 154/57 H    
     
Blood Pressure Mean 95 111 89    
     
Source Monitor Monitor Monitor    
     
Position Semi-Fowlers Semi-Fowlers Sitting    
     
Blood Pressure Location Left Arm Left Arm Left Arm    
     
History Since Last Visit- (Skip if this       
    
is Patient's initial visit)       
     
Have you changed medications since your No No No    
    
last visit?       
     
Any new allergies or adverse reactions No No No    
     
Had a fall/change in ADL's that may No No No    
    
increase risk of falls       
     
Signs or symptoms of abuse and/or No No No    
    
neglect since last visit       
     
Have you been in the hospital since your No No No    
    
last visit?       
     
Has dressing in place as prescribed Yes Yes Yes    
     
Has compression in place as prescribed Yes Yes Yes    
     
Has offloadiing in place as prescribed No N/A N/A    
     
Experienced any changes in pain level or No No No    
    
management       
     
Left Footwear  Regular Shoe Regular Shoe    
     
Right Footwear  Regular Shoe Regular Shoe    
     
Pain Scale: 0-10 Numeric       
     
Is Patient Pain Free? Yes Yes Yes    
     
Lower Extremity Assessment/ Foot       
    
Assessment/ Toe Nail Assessment       
     
Right       
     
 -Posterior Tibial Doppler       
     
 -Dorsalis Pedis Doppler       
     
 -Extremity Color       
     
 -Hair Growth on Legs       
     
 -Hair Growth on Toes       
     
 -Temperature of Extremity       
     
 -Capillary Refill       
     
 -Thick       
     
 -Discolored       
     
 -Deformed       
     
 -Improper Length & Hygeine       
     
Left       
     
 -Posterior Tibial Doppler       
     
 -Dorsalis Pedis Doppler       
     
 -Hair Growth on Legs       
     
 -Hair Growth on Toes       
     
 -Temperature of Extremity       
     
 -Thick       
     
 -Discolored       
     
 -Deformed       
     
 -Improper Length & Hygeine       
     
Communication Assessment       
     
Preferred language       
     
 Required       
     
Able to Read       
     
Able to Write       
     
Communication Tools       
     
Caregiver Communication Skills       
    
Impairment       
     
Right Hearing Abillity       
     
Left Hearing Abillity       
     
Visual Assistive Devices       
     
Teaching Assessment       
     
Preferences       
     
Barriers to Learning       
     
Readiness To Learn       
     
Willingness to Engage in Self Management       
    
Activies       
     
Readiness to Engage in Self Management       
    
Activities       
     
Anxiety Level       
     
Cooperation       
     
Perception       
     
Interest in Health Problem       
     
Education Importance       
     
Does Patient Smoke tobacco or other       
    
substances       
     
Smoking Status       
     
Is Patient Diabetic       
     
Functional Assessment       
     
Recent Decline in Ability to Perform       
     
Culture/Yarsani/       
     
Cultural/Yarsani Needs that may affect       
    
Treatment Plan       
     
Would you allow our hospital  to       
    
meet you for the purpose of spiritual/       
    
emotional support?       
     
 to contact place of [facility]       
     
Teaching: Wound Center       
     
Compression Wraps & Stockings       
     
 -Person Taught       
     
 -Teaching Method       
     
 -Response to teaching       
    
    
WC - Nurse 1 - General Ulcer Measurement              Start:  24 13:20    
Freq:                                                 Status: Active            
Protocol:                                                                       
    
    
Activity Type Activity Date Activity User E-sign Co-sign Detail    
    
Recorded Client Recorded Date Recorded By      
     
Document 24 13:20 KW       
    
wound center 24 13:41 KW       
     
Document 24 11:58 RB       
    
wound center 24 12:00 RB       
     
Document 24 14:53 KW       
    
wound center 24 15:07 KW       
     
Document 24 10:29 RB       
    
wound 24 10:32 RB       
     
Document 24 14:04 JF       
    
[id number] 24 14:11 JF       
     
Document 24 14:47 KW       
    
h 24 14:58 KW       
    
    
    
    
  24    
    
  13:20 11:58 14:53    
     
Wound Center Nurse 1       
     
#1 RT POST LE       
     
 -Current Size (cm) - Length 1.2  1.3    
     
 -Current Size (cm) - Width 0.7  0.4    
     
 -Current Size (cm) - Depth 0.1  0.1    
     
 -Total Square Cm 0.84  0.52    
     
 -Date of Last Picture (Recall this 24      
    
field)       
     
 -Tunneling  No     
     
 -Undermining/Tunneling  No     
     
 -Classification - Thickness  Partial     
    
  Thickness     
     
 -Exudate Amt Small Medium Small    
     
 -Exudate Type Serosanguineous Serosanguineous Serosanguineous    
     
 -Wound Margin Distinct, Distinct, Distinct,    
    
 Outline Outline Outline    
    
 Attached Attached Attached    
     
 -Granulation Amt Small (1-33%) Medium (34-66%) Large (%)    
     
 -Granulation Quality Red Pink Red    
     
 -Slough/Fibrin  Yes     
     
 -Necrosis Amt Large (%) Medium (34-66%)     
     
 -Necrotic Tissue Type Adherent Slough Adherent Slough     
     
 -Structure Exposed  N/A     
     
 -Texture (Natalie-wound Skin Appearance) Assessed Assessed Assessed    
     
 -Moisture (Natalie-wound Skin Appearance) Assessed Assessed Assessed    
     
 -Color (Natalie-wound Skin Appearance) Assessed, Assessed Assessed    
    
 Erythema      
     
 -Temperature (Natalie-wound Skin No Abnormality No Abnormality No Abnormality    
    
Appearance) (Pt Warm) (Pt Warm) (Pt Warm)    
     
 -Tenderness on Palpation (Natalie-wound No No No    
    
Skin Appearance)       
     
 -Ulcer Cleansing Rinsed/ Wound Cleanser Soap and Water    
    
 Irrigated with      
    
 Saline      
     
 -Foul Odor after Cleansing No No No    
     
 -Anesthetic Used 5% Lidocaine  5% Lidocaine    
    
 Gel  Gel    
     
 -Wound Comment(s)       
     
Lower Limb Edema Present  Yes     
     
Right Calf (cm) 31.6 29.5 29    
     
Right Ankle (cm) 21.4 19.5 20    
     
Left Calf (cm) 30.6      
     
Left Ankle (cm) 20.2      
    
    
    
    
  24    
    
  10:29 14:04 14:47    
     
Wound Center Nurse 1       
     
#1 RT POST LE       
     
 -Current Size (cm) - Length  0.1 0.1    
     
 -Current Size (cm) - Width  0.1 0.1    
     
 -Current Size (cm) - Depth  0.1 0.1    
     
 -Total Square Cm  0.01 0.01    
     
 -Date of Last Picture (Recall this   24    
    
field)       
     
 -Tunneling       
     
 -Undermining/Tunneling       
     
 -Classification - Thickness       
     
 -Exudate Amt Small Small None Present    
     
 -Exudate Type Serosanguineous Serosanguineous     
     
 -Wound Margin  Distinct, Distinct,    
    
  Outline Outline    
    
  Attached Attached    
     
 -Granulation Amt  Large (%)     
     
 -Granulation Quality  Red     
     
 -Slough/Fibrin       
     
 -Necrosis Amt       
     
 -Necrotic Tissue Type       
     
 -Structure Exposed       
     
 -Texture (Natalie-wound Skin Appearance)  Assessed Assessed    
     
 -Moisture (Natalie-wound Skin Appearance)  Assessed Assessed    
     
 -Color (Natalie-wound Skin Appearance)  Assessed Assessed    
     
 -Temperature (Natalie-wound Skin  No Abnormality No Abnormality    
    
Appearance)  (Pt Warm) (Pt Warm)    
     
 -Tenderness on Palpation (Natalie-wound  No No    
    
Skin Appearance)       
     
 -Ulcer Cleansing  Soap and Water Soap and Water    
     
 -Foul Odor after Cleansing  No No    
     
 -Anesthetic Used  5% Lidocaine 5% Lidocaine    
    
  Gel Gel    
     
 -Wound Comment(s)   scabbed    
     
Lower Limb Edema Present Yes      
     
Right Calf (cm) 30 28.6     
     
Right Ankle (cm) 19.5 19.7     
     
Left Calf (cm)       
     
Left Ankle (cm)       
    
    
WC - Nurse 2 - General Ulcer CM Notes                 Start:  24 13:20    
Freq:                                                 Status: Active            
Protocol:                                                                       
    
    
Activity Type Activity Date Activity User E-sign Co-sign Detail    
    
Recorded Client Recorded Date Recorded By      
     
Document 24 14:18 DS       
    
[id number] 24 14:22 DS       
     
Document 24 15:32 DS       
    
1 24 15:33 DS       
     
Document 24 14:27 DS       
    
1 24 14:30 DS       
     
Document 24 15:04 JF       
    
0000 24 15:08 JF       
    
    
    
    
  24    
    
  14:18 15:32 14:27    
     
Wound Center Nurse 2       
     
#1 RT POST LE       
     
 -Time 14:15 15:30 14:27    
     
 -Correct Patient Yes Yes Yes    
     
 -Correct Side, Site, Position Yes Yes Yes    
     
 -Correct Procedure Yes Yes Yes    
     
 -Procedure Performed Yes Yes Yes    
     
 -Type of Procedure Debridement Debridement Debridement    
     
 -Clinical Debridement Subcutaneous Subcutaneous Subcutaneous    
     
 -Tissue Removed Subcutaneous Subcutaneous Subcutaneous    
     
 -Post Debridement (cm) - Length 1.4 1 0.6    
     
 -Post Debridement (cm) - Width 1.5 0.8 0.4    
     
 -Post Debridement (cm) - Depth 0.1 0.1 0.1    
     
 -Total Square (Post) (cm) 2.10 0.8 0.24    
     
 -Area of Debridement (cm) - Length 1.4 1.0 0.6    
     
 -Area of Debridement (cm) - Width 1.5 0.8 0.4    
     
 -Total Square (Area) (cm) 2.10 0.80 0.24    
     
 -Tunneling No No No    
     
 -Undermining/Tunneling No No No    
     
 -Circular Undermining No No No    
     
 -Wound/Ulcer Outcome Not Healed Not Healed Not Healed    
     
 -Ulcer Cleansing Rinsed/ Rinsed/ Rinsed/    
    
 Irrigated with Irrigated with Irrigated with    
    
 Saline Saline Saline    
     
 -Foul Odor after Cleansing       
     
 -Bioengineered Tissue       
     
 -Bleeding Controlled with Pressure Pressure Pressure    
     
 -Treatment Response Procedure Procedure Procedure    
    
 Tolerated Well Tolerated Well Tolerated Well    
     
 -Offloading       
     
 -Debridement - Subq, 1st 20sq cm Yes Yes Yes    
     
Pain Scale: 0-10 Numeric       
     
Is Patient Pain Free? Yes Yes Yes    
    
    
    
    
  24    
    
  15:04    
     
Wound Center Nurse 2     
     
#1 RT POST LE     
     
 -Time 15:04    
     
 -Correct Patient Yes    
     
 -Correct Side, Site, Position Yes    
     
 -Correct Procedure Yes    
     
 -Procedure Performed Yes    
     
 -Type of Procedure Debridement    
     
 -Clinical Debridement Subcutaneous    
     
 -Tissue Removed Subcutaneous    
     
 -Post Debridement (cm) - Length 0.3    
     
 -Post Debridement (cm) - Width 0.4    
     
 -Post Debridement (cm) - Depth 0.1    
     
 -Total Square (Post) (cm) 0.12    
     
 -Area of Debridement (cm) - Length 0.3    
     
 -Area of Debridement (cm) - Width 0.4    
     
 -Total Square (Area) (cm) 0.12    
     
 -Tunneling No    
     
 -Undermining/Tunneling No    
     
 -Circular Undermining No    
     
 -Wound/Ulcer Outcome Not Healed    
     
 -Ulcer Cleansing Rinsed/    
    
 Irrigated with    
    
 Saline    
     
 -Foul Odor after Cleansing No    
     
 -Bioengineered Tissue No    
     
 -Bleeding Controlled with Pressure    
     
 -Treatment Response Procedure    
    
 Tolerated Well    
     
 -Offloading No    
     
 -Debridement - Subq, 1st 20sq cm Yes    
     
Pain Scale: 0-10 Numeric     
     
Is Patient Pain Free? Yes    
    
    
 - Nurse 3 - General Ulcer D/C NN                   Start:  24 13:20    
Freq:                                                 Status: Active            
Protocol:                                                                       
    
    
Activity Type Activity Date Activity User E-sign Co-sign Detail    
    
Recorded Client Recorded Date Recorded By      
     
Document 24 11:58 RB       
    
wound center 24 12:00 RB       
     
Document 24 15:43 KW       
    
wound center 24 15:44 KW       
     
Document 24 10:29 RB       
    
wound 24 10:32 RB       
     
Document 24 14:58 RB       
    
[id number] 24 14:58 RB       
     
Document 24 15:20 RB       
    
wound 24 15:21 RB       
    
    
    
    
  24/24    
    
  11:58 15:43 10:29    
     
Vital Signs       
     
Temperature (97.8 F-99.1 F) 97.7 F L  97.7 F L    
     
Temperature Source Temporal  Temporal    
     
Pulse Rate () 73  85    
     
Pulse Location Monitor  Monitor    
     
Respiratory Rate (12-18) 18  18    
     
Respiratory rate source Observation  Observation    
     
Blood Pressure (90//80) 150/69 H  143/71 H    
     
Blood Pressure Mean 96  95    
     
Source Monitor  Monitor    
     
Position Semi-Fowlers  Semi-Fowlers    
     
Blood Pressure Location Left Arm  Left Arm    
     
Pain Scale: 0-10 Numeric       
     
Is Patient Pain Free? Yes Yes Yes    
     
Teaching: Wound Center       
     
Compression Wraps & Stockings       
     
 -Person Taught Patient      
     
 -Teaching Method Discussion      
     
 -Response to teaching Verbalize      
    
 understanding      
     
Wound Care Center Nurse 3       
     
#1 RT POST LE       
     
 -Ulcer Cleansing  Rinsed/ Wound Cleanser    
    
  Irrigated with     
    
  Saline     
     
 -Primary Dressing Applied Promogran Promogran Promogran    
    
 Rayna Matter Rayna Matter Rayna Matter    
     
 -Other Dressing       
     
 -Primary Dressing Covered/Secured with Dry Gauze Dry Gauze & Dry Gauze    
    
  Roll Gauze     
     
 -Promogran Rayna Matter 1 1 1    
     
Right       
     
 -Multi-Layered Wrap Application Multi-Layer Multi-Layer Multi-Layer    
    
 Comp - Right ($ Comp - Right ($ Comp - Right ($    
    
 ) ) )    
     
Treatment Response Procedure  Procedure    
    
 Tolerated Well  Tolerated Well    
     
WC - Visit Discharge       
     
Discharge Condition Stable Stable Stable    
     
Ambulatory Status Ambulatory, Ambulatory, Wheelchair    
    
 Walker Walker     
     
Transportation Private Auto Private Auto NH transport    
     
Medication Reconcilliation completed & No No No    
    
provided to patient/care provider       
     
Clinical Summary of Care Provided Yes Yes No    
    
    
    
    
  24    
    
  14:58 15:20    
     
Vital Signs      
     
Temperature (97.8 F-99.1 F)      
     
Temperature Source      
     
Pulse Rate ()      
     
Pulse Location      
     
Respiratory Rate (12-18)      
     
Respiratory rate source      
     
Blood Pressure (90//80)      
     
Blood Pressure Mean      
     
Source      
     
Position      
     
Blood Pressure Location      
     
Pain Scale: 0-10 Numeric      
     
Is Patient Pain Free? Yes Yes    
     
Teaching: Wound Center      
     
Compression Wraps & Stockings      
     
 -Person Taught      
     
 -Teaching Method      
     
 -Response to teaching      
     
Wound Care Center Nurse 3      
     
#1 RT POST LE      
     
 -Ulcer Cleansing Wound Cleanser Rinsed/    
    
  Irrigated with    
    
  Saline    
     
 -Primary Dressing Applied Promogran NonAdherent    
    
 Rayna Matter Contact Layer    
     
 -Other Dressing  rayna    
     
 -Primary Dressing Covered/Secured with Dry Gauze Dry Gauze    
     
 -Promogran Rayna Matter 1     
     
Right      
     
 -Multi-Layered Wrap Application Multi-Layer Multi-Layer    
    
 Comp - Right ($ Comp - Right ($    
    
 ) )    
     
Treatment Response Procedure Procedure    
    
 Tolerated Well Tolerated Well    
     
WC - Visit Discharge      
     
Discharge Condition Stable Stable    
     
Ambulatory Status Ambulatory, Ambulatory,    
    
 Walker Walker    
     
Transportation Private Auto Private Auto    
     
Medication Reconcilliation completed & No No    
    
provided to patient/care provider      
     
Clinical Summary of Care Provided Yes Yes    
    
    
    
    
    
Charges/Coding    
Procedures Integumentary    
111xxx-113xx: 34837 May subq tissue 20 sq cm/<    
    
Assessment/Plan    
Assessment/Plan    
(1) Non-pressure chronic ulcer of calf:     
CODE(S):       L97.209 - Non-pressure chronic ulcer of unspecified calf with   
unspecified severity    
QUALIFIERS:               Laterality: right  Non-pressure ulcer stage: with fat   
layer exposed  Qualified Code(s): L97.212 - Non-pressure chronic ulcer of right   
calf with fat layer exposed    
(2) Non-pressure chronic ulcer of lower leg:     
CODE(S):       L97.909 - Non-pressure chronic ulcer of unspecified part of   
unspecified lower leg with unspecified severity    
QUALIFIERS:               Laterality: right  Non-pressure ulcer stage: with fat   
layer exposed  Qualified Code(s): L97.912 - Non-pressure chronic ulcer of   
unspecified part of right lower leg with fat layer exposed    
(3) Wound infection:     
CODE(S):       T14.8XXA - Other injury of unspecified body region, initial   
encounter; L08.9 - Local infection of the skin and subcutaneous tissue,   
unspecified    
(4) Leg swelling:     
CODE(S):       M79.89 - Other specified soft tissue disorders    
(5) Leg edema:     
CODE(S):       R60.0 - Localized edema    
(6) Bipolar disorder:     
CODE(S):       F31.9 - Bipolar disorder, unspecified    
(7) Depression:     
CODE(S):       F32.A - Depression, unspecified    
(8) Dementia:     
CODE(S):       F03.90 - Unspecified dementia, unspecified severity, without   
behavioral disturbance, psychotic disturbance, mood disturbance, and anxiety    
(9) Urinary incontinence:     
CODE(S):       R32 - Unspecified urinary incontinence    
(10) Hypertension:     
CODE(S):       I10 - Essential (primary) hypertension    
(11) Hypothyroidism:     
CODE(S):       E03.9 - Hypothyroidism, unspecified    
(12) History of hip surgery:     
CODE(S):       Z98.890 - Other specified postprocedural states    
PLAN:     
Plan    
This is a 73-year-old female who presented with an ulceration on the right   
posterior calf, age-indeterminate.  The patient is a poor historian.  She   
suffers from dementia, bipolar disorder, and depression.  The etiology of the   
patient's ulceration is uncertain.  The ulceration appears generally healthy and  
well-vascularized.  There is no sign of infection or cellulitis at this time.    
The ulceration has decreased in size since the patient's last visit.  We are to   
continue the use of moistened Rayna topically.  Adaptic will be applied   
topically as well.  The patient's right lower extremity is to be wrapped with a   
3M 2 layer compression wrap, which will stay in place undisturbed for several   
days.  This is intended to prevent the patient from  picking  or disturbing the   
ulcer site.  The 2 layer compression wrap is to be changed twice weekly.  The   
patient is to return in 1 week for reassessment.    
    
Total time: 21 minutes

## 2024-06-25 NOTE — PCM.WC.HP
History of Present Illness
Date of Service: 24
Chief Complaint: Ulceration of the right posterior calf
History of Wound: This is a 73-year-old female who is a resident of [facility].  She suffers from bipolar disorder, depression, and dementia.  She is a poor historian.  She presented with an ulceration on the right posterior 
calf, age indeterminate.  The patient is known to be a   .  She has been advised in the past to wear compression stockings, but she refuses, and is not compliant.  She has recently been prescribed cefadroxil 500 mg p.o. twice daily, for a 
10-day course.  This was prescribed as a result of cultures of her ulceration which were performed on May 24, 2024, which revealed the presence of Staph aureus.  It is said that the patient suffers from swelling and edema in her lower extremities.  
She sleeps on a flat mattress at night.  However, she is not very active, and spends a good deal of each day sitting.  She requires a walker for ambulation.  She has a history of hypertension and hypothyroidism.  She otherwise denies a history of 
myocardial infarction, cerebrovascular accident, diabetes mellitus, renal disease, pulmonary disease, and hyperlipidemia.  The patient is of relatively normal body habitus, with a BMI of 19.5. 
 


[facility]
Medical History (Reviewed 24 @ 15:25 by Dr. Evangelista Carvajal MD)

Non-pressure chronic ulcer of calf
Wound infection
Leg edema
Leg swelling
Hypothyroidism
Hypertension
Urinary incontinence
Dementia
Depression
Bipolar disorder
Non-pressure chronic ulcer of lower leg


Home Medications

?Medication ?Instructions ?Recorded ?Last Taken ?Type
Depakote 625 cap PO QHS 24 Unknown History
aspirin 81 mg capsule 81 mg PO DAILY 24 Unknown History
cholecalciferol (vitamin D3) 10 25 mcg PO DAILY 24 Unknown History
mcg/mL (400 unit/mL) oral drops    
(Pedia D-Konstantin)    
donepezil 10 mg tablet 5 mg PO DAILY 24 Unknown History
donepezil 5 mg tablet (Aricept) 5 mg PO QHS 24 Unknown History
hydroxyzine HCl 25 mg tablet 25 mg PO Q8H PRN anxiety 24 Unknown History
levothyroxine 50 mcg tablet 50 mcg PO DAILY 24 Unknown History
loperamide 2 mg capsule 2 mg PO Q6H PRN loose stool 24 Unknown History
melatonin 3 mg capsule 3 mg PO QHS 24 Unknown History
mirtazapine 30 mg tablet 45 mg PO QHS 24 Unknown History
mirtazapine 45 mg tablet 45 mg PO QHS 24 Unknown History




Allergy/AdvReac Type Severity Reaction Status Date / Time
carbamazepine Allergy Intermediate PT UNSURE Verified 24 13:16
   OF REACTION  


Surgical History (Reviewed 24 @ 15:25 by Dr. Evangelista Carvajal MD)

History of hip surgery


Social History (Reviewed 24 @ 15:25 by Dr. Evangelista Carvajal MD)
Smoking Status:  Former smoker 



Vital Signs
Vital Signs
Vital Signs: 


 24
14:47
Temperature 96.0 F L
Temperature Source Temporal
Pulse Rate 92
Respiratory Rate 18
Blood Pressure 154/57 H
Blood Pressure Mean 89
Blood Pressure Source Monitor
Blood Pressure Position Sitting
Blood Pressure Location Left Arm
Oxygen Delivery Method Room Air

Weight

Weight:                        107 lb                                            
Body Mass Index (BMI)          19.5                                              




Physical Exam
Const
alert, no apparent distress, average body habitus and well nourished
Constitutional Narrative: 
The patient is alert and interactive, though confused and unable to state her age and responds inappropriately to routine questions.  The patient is of relatively normal body habitus, with a BMI of 19.5.
General Appearance: cooperative, comfortable, well kempt and well developed
Orientation / Consciousness: awake, confused and disoriented
HEENT
normocephalic and head/scalp atraumatic
Head and Scalp: normal to inspection, normocephalic and atraumatic
Face and Sinus: normal facial exam
External Ear: external ears normal
Eyes
EOMs intact bilaterally
General Eye: normal appearance of both eyes
Resp
normal respiratory effort, normal air movement, no retractions and no use of accessory muscles
Effort and Inspection: able to speak in complete sentences
Extremity
no calf tenderness
General Extremity: Negative for clubbing or cyanosis
Skin
Wound Narrative: 
An ulceration is noted on the patient's right posterior calf.  It is pink and healthy in appearance, with no sign of infection or cellulitis.  There is a small amount of bioburden.  Dimensions are documented elsewhere.  The ulceration is 
significantly smaller than the patient's prior visit.  There is no significant swelling or edema noted in the patient's lower extremities at this time.  
Neuro
CN's II-XII intact bilaterally, moves all extremities and no focal motor deficits
Sensorium / Orientation: awake, alert, orientation impaired and confused
Psych
Appearance: grossly normal and appropriate
Attitude: calm
Activity / Motor Behavior: appropriate eye contact
Speech: normal speech
Mood & Affect: euthymic mood
Thought Process: normal thought process
Thought Content: normal thought content
Attention / Concentration: attention grossly intact

Debridement Note
Debridement Note
Wound debrided: Right posterior calf
Laterality: Right
Type of Debridement: Excisional debridement
Anesthesia Used: 5% Lidocaine Gel
Depth: Down to and including healthy tissue and in the subcutaneous layer
Percentage of wound debrided: 100
Instrument Used: 3mm curette
Tissue Removed: Bioburden and senescent material
Severity: Fat Layer Exposed
Amount of bleeding with debridement: Mild
Bleeding Controlled with: Compression and gauze
Patient tolerated procedure: Patient tolerated procedure well
Post-Debridement Measurements and Additional Note: 
Post-Debridement Measurements/Treatment

 - Nurse 1 - General Ulcer Assessment               Start:  24 13:20
Freq:                                                 Status: Active        
Protocol:  .LOWEXT                                                        
Activity Type Activity Date Activity User E-sign Co-sign Detail
Recorded Client Recorded Date Recorded By   
Document 24 13:20 KW   
wound center 24 13:41 KW   
Document 24 11:58 RB   
wound center 24 12:00 RB   
Document 24 14:53 KW   
wound center 24 15:07 KW   
Document 24 10:29 RB   
wound 24 10:32 RB   
Document 24 14:04 JF   
[id number] 24 14:11 JF   
Document 24 14:47 KW   
h 24 14:58 KW   

  24
  13:20 11:58 14:53
 - Today's Visit Information   
Type of service Initial Visit Follow-up Visit Follow-up Visit
  (Physician/CNP (Physician/CNP
  ) )
Arrival Mode Ambulatory, Ambulatory, Ambulatory,
 Walker Walker Walker
Transfer Assistance  None 
Accompanied by CARE GIVER  
Patient Identification Verified (Name & Yes Yes Yes
)   
Patient Requires Transmission-Based  No 
Precautions   
Height and Weight   
Height 5 ft 2 in  
Weight 107 lb  
Weight in Pounds 107.0 lbs  
Body Mass Index (BMI) 19.5 19.5 19.5
BMI Classification Normal Normal Normal
BSA - Darrell 1.47  
Vital Signs   
Temperature (97.8 F-99.1 F) 97.7 F L 97.7 F L 96.1 F L
Temperature Source Temporal Temporal Temporal
Pulse Rate () 79 73 76
Pulse Location Monitor Monitor Monitor
Respiratory Rate (12-18) 18 18 18
Respiratory rate source Observation Observation Observation
Oxygen Delivery Method Room Air  Room Air
Blood Pressure (90//80) 148/57 H 150/69 H 151/61 H
Blood Pressure Mean 87 96 91
Source Monitor Monitor Monitor
Position Semi-Fowlers Semi-Fowlers Semi-Fowlers
Blood Pressure Location Left Arm Left Arm Left Arm
History Since Last Visit- (Skip if this   
is Patient's initial visit)   
Have you changed medications since your  No No
last visit?   
Any new allergies or adverse reactions  No No
Had a fall/change in ADL's that may  No No
increase risk of falls   
Signs or symptoms of abuse and/or  No No
neglect since last visit   
Have you been in the hospital since your  No No
last visit?   
Has dressing in place as prescribed  Yes Yes
Has compression in place as prescribed  Yes Yes
Has offloadiing in place as prescribed  No N/A
Experienced any changes in pain level or  No No
management   
Left Footwear Regular Shoe  Regular Shoe
Right Footwear Regular Shoe  Regular Shoe
Pain Scale: 0-10 Numeric   
Is Patient Pain Free? Yes Yes Yes
Lower Extremity Assessment/ Foot   
Assessment/ Toe Nail Assessment   
Right   
 -Posterior Tibial Doppler Multiphasic  
 -Dorsalis Pedis Doppler Multiphasic  
 -Extremity Color Red  
 -Hair Growth on Legs Yes  
 -Hair Growth on Toes No  
 -Temperature of Extremity Warm  
 -Capillary Refill Less than 3  
 Seconds  
 -Thick No  
 -Discolored No  
 -Deformed No  
 -Improper Length & Hygeine No  
Left   
 -Posterior Tibial Doppler Multiphasic  
 -Dorsalis Pedis Doppler Monophasic  
 -Hair Growth on Legs Yes  
 -Hair Growth on Toes No  
 -Temperature of Extremity Cool  
 -Thick No  
 -Discolored No  
 -Deformed No  
 -Improper Length & Hygeine No  
Communication Assessment   
Preferred language English  
 Required No  
Able to Read Yes  
Able to Write Yes  
Communication Tools None  
Caregiver Communication Skills No Impairment  
Impairment   
Right Hearing Abillity Normal  
Left Hearing Abillity Normal  
Visual Assistive Devices Glasses  
Teaching Assessment   
Preferences Verbal,Written,  
 Demonstration  
Barriers to Learning None  
Readiness To Learn Excellent  
Willingness to Engage in Self Management High  
Activies   
Readiness to Engage in Self Management High  
Activities   
Anxiety Level Calm  
Cooperation Cooperative  
Perception Coherent  
Interest in Health Problem Asks Questions  
Education Importance Acknowledges  
 Need  
Does Patient Smoke tobacco or other Yes  
substances   
Smoking Status Former smoker  
Is Patient Diabetic No  
Functional Assessment   
Recent Decline in Ability to Perform Ambulation,  
 Toileting  
Culture/Evangelical/   
Cultural/Evangelical Needs that may affect No  
Treatment Plan   
Would you allow our hospital  to No  
meet you for the purpose of spiritual/   
emotional support?   
 to contact place of Islam No  
Teaching: Wound Center   
Compression Wraps & Stockings   
 -Person Taught  Patient 
 -Teaching Method  Discussion 
 -Response to teaching  Verbalize 
  understanding 

  24
  10:29 14:04 14:47
WC - Today's Visit Information   
Type of service Nurse-only Follow-up Visit Follow-up Visit
 Visit (Physician/CNP (Physician/CNP
  ) )
Arrival Mode Wheelchair Ambulatory, Walker
  Walker 
Transfer Assistance None  
Accompanied by   
Patient Identification Verified (Name & Yes Yes 
)   
Patient Requires Transmission-Based No  
Precautions   
Height and Weight   
Height   
Weight   
Weight in Pounds   
Body Mass Index (BMI) 19.5 19.5 19.5
BMI Classification Normal Normal Normal
BSA - Darrell   
Vital Signs   
Temperature (97.8 F-99.1 F) 97.7 F L  96.0 F L
Temperature Source Temporal  Temporal
Pulse Rate () 85 105 H 92
Pulse Location Monitor Monitor Monitor
Respiratory Rate (12-18) 18 16 18
Respiratory rate source Observation Observation Observation
Oxygen Delivery Method  Room Air Room Air
Blood Pressure (90//80) 143/71 H 180/77 H 154/57 H
Blood Pressure Mean 95 111 89
Source Monitor Monitor Monitor
Position Semi-Fowlers Semi-Fowlers Sitting
Blood Pressure Location Left Arm Left Arm Left Arm
History Since Last Visit- (Skip if this   
is Patient's initial visit)   
Have you changed medications since your No No No
last visit?   
Any new allergies or adverse reactions No No No
Had a fall/change in ADL's that may No No No
increase risk of falls   
Signs or symptoms of abuse and/or No No No
neglect since last visit   
Have you been in the hospital since your No No No
last visit?   
Has dressing in place as prescribed Yes Yes Yes
Has compression in place as prescribed Yes Yes Yes
Has offloadiing in place as prescribed No N/A N/A
Experienced any changes in pain level or No No No
management   
Left Footwear  Regular Shoe Regular Shoe
Right Footwear  Regular Shoe Regular Shoe
Pain Scale: 0-10 Numeric   
Is Patient Pain Free? Yes Yes Yes
Lower Extremity Assessment/ Foot   
Assessment/ Toe Nail Assessment   
Right   
 -Posterior Tibial Doppler   
 -Dorsalis Pedis Doppler   
 -Extremity Color   
 -Hair Growth on Legs   
 -Hair Growth on Toes   
 -Temperature of Extremity   
 -Capillary Refill   
 -Thick   
 -Discolored   
 -Deformed   
 -Improper Length & Hygeine   
Left   
 -Posterior Tibial Doppler   
 -Dorsalis Pedis Doppler   
 -Hair Growth on Legs   
 -Hair Growth on Toes   
 -Temperature of Extremity   
 -Thick   
 -Discolored   
 -Deformed   
 -Improper Length & Hygeine   
Communication Assessment   
Preferred language   
 Required   
Able to Read   
Able to Write   
Communication Tools   
Caregiver Communication Skills   
Impairment   
Right Hearing Abillity   
Left Hearing Abillity   
Visual Assistive Devices   
Teaching Assessment   
Preferences   
Barriers to Learning   
Readiness To Learn   
Willingness to Engage in Self Management   
Activies   
Readiness to Engage in Self Management   
Activities   
Anxiety Level   
Cooperation   
Perception   
Interest in Health Problem   
Education Importance   
Does Patient Smoke tobacco or other   
substances   
Smoking Status   
Is Patient Diabetic   
Functional Assessment   
Recent Decline in Ability to Perform   
Culture/Evangelical/   
Cultural/Evangelical Needs that may affect   
Treatment Plan   
Would you allow our hospital  to   
meet you for the purpose of spiritual/   
emotional support?   
 to contact place of Islam   
Teaching: Wound Center   
Compression Wraps & Stockings   
 -Person Taught   
 -Teaching Method   
 -Response to teaching   

WC - Nurse 1 - General Ulcer Measurement              Start:  24 13:20
Freq:                                                 Status: Active        
Protocol:                                                                   
Activity Type Activity Date Activity User E-sign Co-sign Detail
Recorded Client Recorded Date Recorded By   
Document 24 13:20 KW   
wound center 24 13:41 KW   
Document 24 11:58 RB   
wound center 24 12:00 RB   
Document 24 14:53 KW   
wound center 24 15:07 KW   
Document 24 10:29 RB   
wound 24 10:32 RB   
Document 24 14:04 JF   
[id number] 24 14:11 JF   
Document 24 14:47 KW   
h 24 14:58 KW   

  24
  13:20 11:58 14:53
Wound Center Nurse 1   
#1 RT POST LE   
 -Current Size (cm) - Length 1.2  1.3
 -Current Size (cm) - Width 0.7  0.4
 -Current Size (cm) - Depth 0.1  0.1
 -Total Square Cm 0.84  0.52
 -Date of Last Picture (Recall this 24  
field)   
 -Tunneling  No 
 -Undermining/Tunneling  No 
 -Classification - Thickness  Partial 
  Thickness 
 -Exudate Amt Small Medium Small
 -Exudate Type Serosanguineous Serosanguineous Serosanguineous
 -Wound Margin Distinct, Distinct, Distinct,
 Outline Outline Outline
 Attached Attached Attached
 -Granulation Amt Small (1-33%) Medium (34-66%) Large (%)
 -Granulation Quality Red Pink Red
 -Slough/Fibrin  Yes 
 -Necrosis Amt Large (%) Medium (34-66%) 
 -Necrotic Tissue Type Adherent Slough Adherent Slough 
 -Structure Exposed  N/A 
 -Texture (Natalie-wound Skin Appearance) Assessed Assessed Assessed
 -Moisture (Natalie-wound Skin Appearance) Assessed Assessed Assessed
 -Color (Natalie-wound Skin Appearance) Assessed, Assessed Assessed
 Erythema  
 -Temperature (Natalie-wound Skin No Abnormality No Abnormality No Abnormality
Appearance) (Pt Warm) (Pt Warm) (Pt Warm)
 -Tenderness on Palpation (Natalie-wound No No No
Skin Appearance)   
 -Ulcer Cleansing Rinsed/ Wound Cleanser Soap and Water
 Irrigated with  
 Saline  
 -Foul Odor after Cleansing No No No
 -Anesthetic Used 5% Lidocaine  5% Lidocaine
 Gel  Gel
 -Wound Comment(s)   
Lower Limb Edema Present  Yes 
Right Calf (cm) 31.6 29.5 29
Right Ankle (cm) 21.4 19.5 20
Left Calf (cm) 30.6  
Left Ankle (cm) 20.2  

  24
  10:29 14:04 14:47
Wound Center Nurse 1   
#1 RT POST LE   
 -Current Size (cm) - Length  0.1 0.1
 -Current Size (cm) - Width  0.1 0.1
 -Current Size (cm) - Depth  0.1 0.1
 -Total Square Cm  0.01 0.01
 -Date of Last Picture (Recall this   24
field)   
 -Tunneling   
 -Undermining/Tunneling   
 -Classification - Thickness   
 -Exudate Amt Small Small None Present
 -Exudate Type Serosanguineous Serosanguineous 
 -Wound Margin  Distinct, Distinct,
  Outline Outline
  Attached Attached
 -Granulation Amt  Large (%) 
 -Granulation Quality  Red 
 -Slough/Fibrin   
 -Necrosis Amt   
 -Necrotic Tissue Type   
 -Structure Exposed   
 -Texture (Natalie-wound Skin Appearance)  Assessed Assessed
 -Moisture (Natalie-wound Skin Appearance)  Assessed Assessed
 -Color (Natalie-wound Skin Appearance)  Assessed Assessed
 -Temperature (Natalie-wound Skin  No Abnormality No Abnormality
Appearance)  (Pt Warm) (Pt Warm)
 -Tenderness on Palpation (Natalie-wound  No No
Skin Appearance)   
 -Ulcer Cleansing  Soap and Water Soap and Water
 -Foul Odor after Cleansing  No No
 -Anesthetic Used  5% Lidocaine 5% Lidocaine
  Gel Gel
 -Wound Comment(s)   scabbed
Lower Limb Edema Present Yes  
Right Calf (cm) 30 28.6 
Right Ankle (cm) 19.5 19.7 
Left Calf (cm)   
Left Ankle (cm)   

WC - Nurse 2 - General Ulcer CM Notes                 Start:  24 13:20
Freq:                                                 Status: Active        
Protocol:                                                                   
Activity Type Activity Date Activity User E-sign Co-sign Detail
Recorded Client Recorded Date Recorded By   
Document 24 14:18 DS   
[id number] 24 14:22 DS   
Document 24 15:32 DS   
1 24 15:33 DS   
Document 24 14:27 DS   
1 24 14:30 DS   
Document 24 15:04 JF   
0000 24 15:08 JF   

  24
  14:18 15:32 14:27
Wound Center Nurse 2   
#1 RT POST LE   
 -Time 14:15 15:30 14:27
 -Correct Patient Yes Yes Yes
 -Correct Side, Site, Position Yes Yes Yes
 -Correct Procedure Yes Yes Yes
 -Procedure Performed Yes Yes Yes
 -Type of Procedure Debridement Debridement Debridement
 -Clinical Debridement Subcutaneous Subcutaneous Subcutaneous
 -Tissue Removed Subcutaneous Subcutaneous Subcutaneous
 -Post Debridement (cm) - Length 1.4 1 0.6
 -Post Debridement (cm) - Width 1.5 0.8 0.4
 -Post Debridement (cm) - Depth 0.1 0.1 0.1
 -Total Square (Post) (cm) 2.10 0.8 0.24
 -Area of Debridement (cm) - Length 1.4 1.0 0.6
 -Area of Debridement (cm) - Width 1.5 0.8 0.4
 -Total Square (Area) (cm) 2.10 0.80 0.24
 -Tunneling No No No
 -Undermining/Tunneling No No No
 -Circular Undermining No No No
 -Wound/Ulcer Outcome Not Healed Not Healed Not Healed
 -Ulcer Cleansing Rinsed/ Rinsed/ Rinsed/
 Irrigated with Irrigated with Irrigated with
 Saline Saline Saline
 -Foul Odor after Cleansing   
 -Bioengineered Tissue   
 -Bleeding Controlled with Pressure Pressure Pressure
 -Treatment Response Procedure Procedure Procedure
 Tolerated Well Tolerated Well Tolerated Well
 -Offloading   
 -Debridement - Subq, 1st 20sq cm Yes Yes Yes
Pain Scale: 0-10 Numeric   
Is Patient Pain Free? Yes Yes Yes

  24
  15:04
Wound Center Nurse 2 
#1 RT POST LE 
 -Time 15:04
 -Correct Patient Yes
 -Correct Side, Site, Position Yes
 -Correct Procedure Yes
 -Procedure Performed Yes
 -Type of Procedure Debridement
 -Clinical Debridement Subcutaneous
 -Tissue Removed Subcutaneous
 -Post Debridement (cm) - Length 0.3
 -Post Debridement (cm) - Width 0.4
 -Post Debridement (cm) - Depth 0.1
 -Total Square (Post) (cm) 0.12
 -Area of Debridement (cm) - Length 0.3
 -Area of Debridement (cm) - Width 0.4
 -Total Square (Area) (cm) 0.12
 -Tunneling No
 -Undermining/Tunneling No
 -Circular Undermining No
 -Wound/Ulcer Outcome Not Healed
 -Ulcer Cleansing Rinsed/
 Irrigated with
 Saline
 -Foul Odor after Cleansing No
 -Bioengineered Tissue No
 -Bleeding Controlled with Pressure
 -Treatment Response Procedure
 Tolerated Well
 -Offloading No
 -Debridement - Subq, 1st 20sq cm Yes
Pain Scale: 0-10 Numeric 
Is Patient Pain Free? Yes

 - Nurse 3 - General Ulcer D/C NN                   Start:  24 13:20
Freq:                                                 Status: Active        
Protocol:                                                                   
Activity Type Activity Date Activity User E-sign Co-sign Detail
Recorded Client Recorded Date Recorded By   
Document 24 11:58 RB   
wound center 24 12:00 RB   
Document 24 15:43 KW   
wound center 24 15:44 KW   
Document 24 10:29 RB   
wound 24 10:32 RB   
Document 24 14:58 RB   
[id number] 24 14:58 RB   
Document 24 15:20 RB   
wound 24 15:21 RB   

  2424
  11:58 15:43 10:29
Vital Signs   
Temperature (97.8 F-99.1 F) 97.7 F L  97.7 F L
Temperature Source Temporal  Temporal
Pulse Rate () 73  85
Pulse Location Monitor  Monitor
Respiratory Rate (12-18) 18  18
Respiratory rate source Observation  Observation
Blood Pressure (90//80) 150/69 H  143/71 H
Blood Pressure Mean 96  95
Source Monitor  Monitor
Position Semi-Fowlers  Semi-Fowlers
Blood Pressure Location Left Arm  Left Arm
Pain Scale: 0-10 Numeric   
Is Patient Pain Free? Yes Yes Yes
Teaching: Wound Center   
Compression Wraps & Stockings   
 -Person Taught Patient  
 -Teaching Method Discussion  
 -Response to teaching Verbalize  
 understanding  
Wound Care Center Nurse 3   
#1 RT POST LE   
 -Ulcer Cleansing  Rinsed/ Wound Cleanser
  Irrigated with 
  Saline 
 -Primary Dressing Applied Promogran Promogran Promogran
 Rayna Matter Rayna Matter Rayna Matter
 -Other Dressing   
 -Primary Dressing Covered/Secured with Dry Gauze Dry Gauze & Dry Gauze
  Roll Gauze 
 -Promogran Rayna Matter 1 1 1
Right   
 -Multi-Layered Wrap Application Multi-Layer Multi-Layer Multi-Layer
 Comp - Right ($ Comp - Right ($ Comp - Right ($
 ) ) )
Treatment Response Procedure  Procedure
 Tolerated Well  Tolerated Well
WC - Visit Discharge   
Discharge Condition Stable Stable Stable
Ambulatory Status Ambulatory, Ambulatory, Wheelchair
 Walker Walker 
Transportation Private Auto Private Auto NH transport
Medication Reconcilliation completed & No No No
provided to patient/care provider   
Clinical Summary of Care Provided Yes Yes No

  24
  14:58 15:20
Vital Signs  
Temperature (97.8 F-99.1 F)  
Temperature Source  
Pulse Rate ()  
Pulse Location  
Respiratory Rate (12-18)  
Respiratory rate source  
Blood Pressure (90//80)  
Blood Pressure Mean  
Source  
Position  
Blood Pressure Location  
Pain Scale: 0-10 Numeric  
Is Patient Pain Free? Yes Yes
Teaching: Wound Center  
Compression Wraps & Stockings  
 -Person Taught  
 -Teaching Method  
 -Response to teaching  
Wound Care Center Nurse 3  
#1 RT POST LE  
 -Ulcer Cleansing Wound Cleanser Rinsed/
  Irrigated with
  Saline
 -Primary Dressing Applied Promogran NonAdherent
 Rayna Matter Contact Layer
 -Other Dressing  rayna
 -Primary Dressing Covered/Secured with Dry Gauze Dry Gauze
 -Promogran Rayna Matter 1 
Right  
 -Multi-Layered Wrap Application Multi-Layer Multi-Layer
 Comp - Right ($ Comp - Right ($
 ) )
Treatment Response Procedure Procedure
 Tolerated Well Tolerated Well
WC - Visit Discharge  
Discharge Condition Stable Stable
Ambulatory Status Ambulatory, Ambulatory,
 Walker Walker
Transportation Private Auto Private Auto
Medication Reconcilliation completed & No No
provided to patient/care provider  
Clinical Summary of Care Provided Yes Yes




Charges/Coding
Procedures Integumentary
111xxx-113xx: 42000 May subq tissue 20 sq cm/<

Assessment/Plan
Assessment/Plan
(1) Non-pressure chronic ulcer of calf: 
CODE(S):       L97.209 - Non-pressure chronic ulcer of unspecified calf with unspecified severity
QUALIFIERS:               Laterality: right  Non-pressure ulcer stage: with fat layer exposed  Qualified Code(s): L97.212 - Non-pressure chronic ulcer of right calf with fat layer exposed
(2) Non-pressure chronic ulcer of lower leg: 
CODE(S):       L97.909 - Non-pressure chronic ulcer of unspecified part of unspecified lower leg with unspecified severity
QUALIFIERS:               Laterality: right  Non-pressure ulcer stage: with fat layer exposed  Qualified Code(s): L97.912 - Non-pressure chronic ulcer of unspecified part of right lower leg with fat layer exposed
(3) Wound infection: 
CODE(S):       T14.8XXA - Other injury of unspecified body region, initial encounter; L08.9 - Local infection of the skin and subcutaneous tissue, unspecified
(4) Leg swelling: 
CODE(S):       M79.89 - Other specified soft tissue disorders
(5) Leg edema: 
CODE(S):       R60.0 - Localized edema
(6) Bipolar disorder: 
CODE(S):       F31.9 - Bipolar disorder, unspecified
(7) Depression: 
CODE(S):       F32.A - Depression, unspecified
(8) Dementia: 
CODE(S):       F03.90 - Unspecified dementia, unspecified severity, without behavioral disturbance, psychotic disturbance, mood disturbance, and anxiety
(9) Urinary incontinence: 
CODE(S):       R32 - Unspecified urinary incontinence
(10) Hypertension: 
CODE(S):       I10 - Essential (primary) hypertension
(11) Hypothyroidism: 
CODE(S):       E03.9 - Hypothyroidism, unspecified
(12) History of hip surgery: 
CODE(S):       Z98.890 - Other specified postprocedural states
PLAN: 
Plan
This is a 73-year-old female who presented with an ulceration on the right posterior calf, age-indeterminate.  The patient is a poor historian.  She suffers from dementia, bipolar disorder, and depression.  The etiology of the patient's ulceration 
is uncertain.  The ulceration appears generally healthy and well-vascularized.  There is no sign of infection or cellulitis at this time.  The ulceration has decreased in size since the patient's last visit.  We are to continue the use of moistened 
Rayna topically.  Adaptic will be applied topically as well.  The patient's right lower extremity is to be wrapped with a 3M 2 layer compression wrap, which will stay in place undisturbed for several days.  This is intended to prevent the patient 
from  picking  or disturbing the ulcer site.  The 2 layer compression wrap is to be changed twice weekly.  The patient is to return in 1 week for reassessment.

Total time: 21 minutes

## 2024-07-01 VITALS
HEART RATE: 92 BPM | DIASTOLIC BLOOD PRESSURE: 57 MMHG | SYSTOLIC BLOOD PRESSURE: 154 MMHG | TEMPERATURE: 96 F | RESPIRATION RATE: 18 BRPM

## 2024-07-02 VITALS
RESPIRATION RATE: 16 BRPM | HEART RATE: 70 BPM | DIASTOLIC BLOOD PRESSURE: 64 MMHG | TEMPERATURE: 98.1 F | SYSTOLIC BLOOD PRESSURE: 166 MMHG

## 2024-07-02 NOTE — PCM.WC.HP
History of Present Illness
Date of Service: 24
Chief Complaint: Ulceration of the right posterior calf
History of Wound: This is a 73-year-old female who is a resident of [facility].  She suffers from bipolar disorder, depression, and dementia.  She is a poor historian.  She presented with an ulceration on the right posterior 
calf, age indeterminate.  The patient is known to be a   .  She has been advised in the past to wear compression stockings, but she refuses, and is not compliant.  She has recently been prescribed cefadroxil 500 mg p.o. twice daily, for a 
10-day course.  This was prescribed as a result of cultures of her ulceration which were performed on May 24, 2024, which revealed the presence of Staph aureus.  It is said that the patient suffers from swelling and edema in her lower extremities.  
She sleeps on a flat mattress at night.  However, she is not very active, and spends a good deal of each day sitting.  She has a history of hypertension and hypothyroidism.  She otherwise denies a history of myocardial infarction, cerebrovascular 
accident, diabetes mellitus, renal disease, pulmonary disease, and hyperlipidemia.  The patient is of relatively normal body habitus, with a BMI of 19.5. 
 


[facility]
Medical History (Reviewed 24 @ 15:47 by Dr. Evangelista Carvajal MD)

Non-pressure chronic ulcer of calf
Wound infection
Leg edema
Leg swelling
Hypothyroidism
Hypertension
Urinary incontinence
Dementia
Depression
Bipolar disorder
Non-pressure chronic ulcer of lower leg


Home Medications

?Medication ?Instructions ?Recorded ?Last Taken ?Type
Depakote 625 cap PO QHS 24 Unknown History
aspirin 81 mg capsule 81 mg PO DAILY 24 Unknown History
cholecalciferol (vitamin D3) 10 25 mcg PO DAILY 24 Unknown History
mcg/mL (400 unit/mL) oral drops    
(Pedia D-Konstantin)    
donepezil 10 mg tablet 5 mg PO DAILY 24 Unknown History
donepezil 5 mg tablet (Aricept) 5 mg PO QHS 24 Unknown History
hydroxyzine HCl 25 mg tablet 25 mg PO Q8H PRN anxiety 24 Unknown History
levothyroxine 50 mcg tablet 50 mcg PO DAILY 24 Unknown History
loperamide 2 mg capsule 2 mg PO Q6H PRN loose stool 24 Unknown History
melatonin 3 mg capsule 3 mg PO QHS 24 Unknown History
mirtazapine 30 mg tablet 45 mg PO QHS 24 Unknown History
mirtazapine 45 mg tablet 45 mg PO QHS 24 Unknown History




Allergy/AdvReac Type Severity Reaction Status Date / Time
carbamazepine Allergy Intermediate PT UNSURE Verified 24 13:16
   OF REACTION  


Surgical History (Reviewed 24 @ 15:47 by Dr. Evangelista Carvajal MD)

History of hip surgery


Social History (Reviewed 24 @ 15:47 by Dr. Evangelista Carvajal MD)
Smoking Status:  Former smoker 



Vital Signs
Vital Signs
Vital Signs: 


 24
14:30
Temperature 98.1 F
Temperature Source Temporal
Pulse Rate 70
Respiratory Rate 16
Blood Pressure 166/64 H
Blood Pressure Mean 98
Blood Pressure Source Monitor
Blood Pressure Position Sitting
Blood Pressure Location Left Arm
Oxygen Delivery Method Room Air

Weight

Weight:                        107 lb                                            
Body Mass Index (BMI)          19.5                                              




Physical Exam
Const
alert, no apparent distress, average body habitus and well nourished
Constitutional Narrative: 
The patient is alert and interactive, though confused.  The patient is of relatively normal body habitus, with a BMI of 19.5.
General Appearance: cooperative, comfortable, well kempt and well developed
Orientation / Consciousness: awake, confused and disoriented
HEENT
normocephalic and head/scalp atraumatic
Head and Scalp: normal to inspection, normocephalic and atraumatic
Face and Sinus: normal facial exam
External Ear: external ears normal
Eyes
EOMs intact bilaterally
General Eye: normal appearance of both eyes
Resp
normal respiratory effort, normal air movement, no retractions and no use of accessory muscles
Effort and Inspection: able to speak in complete sentences
Extremity
no calf tenderness
General Extremity: Negative for clubbing or cyanosis
Skin
Wound Narrative: 
An ulceration is noted on the patient's right posterior calf.  It is pink and healthy in appearance, with no sign of infection or cellulitis.  There is a small amount of bioburden.  Dimensions are documented elsewhere.  The ulceration continues to 
decrease in size.  There are now several small superficial excoriations in the right gaiter area, associated with some scaly, dermatitic changes.  There is no significant swelling or edema noted in the patient's lower extremities at this time.  
Neuro
CN's II-XII intact bilaterally, moves all extremities and no focal motor deficits
Sensorium / Orientation: awake, alert, orientation impaired and confused
Psych
Appearance: grossly normal and appropriate
Attitude: calm
Activity / Motor Behavior: appropriate eye contact
Speech: normal speech
Mood & Affect: euthymic mood
Thought Process: normal thought process
Thought Content: normal thought content
Attention / Concentration: attention grossly intact

Debridement Note
Debridement Note
Wound debrided: Right posterior calf
Laterality: Right
Type of Debridement: Excisional debridement
Anesthesia Used: 5% Lidocaine Gel
Depth: Down to and including healthy tissue and in the subcutaneous layer
Percentage of wound debrided: 100
Instrument Used: 3mm curette
Tissue Removed: Bioburden and senescent material
Severity: Fat Layer Exposed
Amount of bleeding with debridement: Mild
Bleeding Controlled with: Compression and gauze
Patient tolerated procedure: Patient tolerated procedure well
Post-Debridement Measurements and Additional Note: 
Post-Debridement Measurements/Treatment

 - Nurse 1 - General Ulcer Assessment               Start:  24 14:30
Freq:                                                 Status: Active        
Protocol:  JACK.[email or web address]                                                        
Activity Type Activity Date Activity User E-sign Co-sign Detail
Recorded Client Recorded Date Recorded By   
Document 24 14:30 KW   
z 24 14:36 KW   

  24
  14:30
 - Today's Visit Information 
Type of service Follow-up Visit
 (Physician/CNP
 )
Arrival Mode Ambulatory,
 Walker
Transfer Assistance None
Accompanied by caregiver
Patient Identification Verified (Name & Yes
) 
Patient Requires Transmission-Based No
Precautions 
Height and Weight 
Body Mass Index (BMI) 19.5
BMI Classification Normal
Vital Signs 
Temperature (97.8 F-99.1 F) 98.1 F
Temperature Source Temporal
Pulse Rate () 70
Pulse Location Monitor
Respiratory Rate (12-18) 16
Respiratory rate source Observation
Oxygen Delivery Method Room Air
Blood Pressure (90//80) 166/64 H
Blood Pressure Mean 98
Source Monitor
Position Sitting
Blood Pressure Location Left Arm
History Since Last Visit- (Skip if this 
is Patient's initial visit) 
Have you changed medications since your No
last visit? 
Any new allergies or adverse reactions No
Had a fall/change in ADL's that may No
increase risk of falls 
Signs or symptoms of abuse and/or No
neglect since last visit 
Have you been in the hospital since your No
last visit? 
Has dressing in place as prescribed Yes
Has compression in place as prescribed No
Has offloadiing in place as prescribed N/A
Experienced any changes in pain level or No
management 
Left Footwear Regular Shoe
Right Footwear Regular Shoe
Pain Scale: 0-10 Numeric 
Is Patient Pain Free? Yes

WC - Nurse 1 - General Ulcer Measurement              Start:  24 14:30
Freq:                                                 Status: Active        
Protocol:                                                                   
Activity Type Activity Date Activity User E-sign Co-sign Detail
Recorded Client Recorded Date Recorded By   
Document 24 14:30 KW   
z 24 14:36 KW   

  24
  14:30
Wound Center Nurse 1 
#2- R ANTERIOR LE CLUSTER 
 -Combined with other wound No
 -Current Size (cm) - Length 8.2
 -Current Size (cm) - Width 5.5
 -Current Size (cm) - Depth 0.1
 -Total Square Cm 45.10
 -Date of Last Picture (Recall this 24
field) 
 -Photo Taken Yes
 -Epithelialization None Present
 -Tunneling No
 -Undermining/Tunneling No
 -Circular Undermining No
 -Exudate Amt Medium
 -Exudate Type Serosanguineous
 -Wound Margin Distinct,
 Outline
 Attached
 -Granulation Amt Large (%)
 -Granulation Quality Red
 -Slough/Fibrin No
 -Necrosis Amt None Present (0
 %)
 -Texture (Natalie-wound Skin Appearance) Assessed
 -Moisture (Natalie-wound Skin Appearance) Assessed
 -Color (Natalie-wound Skin Appearance) Assessed
 -Temperature (Natalie-wound Skin No Abnormality
Appearance) (Pt Warm)
 -Tenderness on Palpation (Natalie-wound No
Skin Appearance) 
 -Ulcer Cleansing Rinsed/
 Irrigated with
 Saline
 -Foul Odor after Cleansing No
 -Anesthetic Used 5% Lidocaine
 Gel
#1 RT POST LE 
 -Combined with other wound No
 -Current Size (cm) - Length 0.3
 -Current Size (cm) - Width 0.2
 -Current Size (cm) - Depth 0.1
 -Total Square Cm 0.06
 -Date of Last Picture (Recall this 24
field) 
 -Photo Taken Yes
 -Epithelialization None Present
 -Tunneling No
 -Undermining/Tunneling No
 -Circular Undermining No
 -Exudate Amt None Present
 -Wound Margin Distinct,
 Outline
 Attached
 -Granulation Amt None Present (0
 %)
 -Slough/Fibrin Yes
 -Necrosis Amt Large (%)
 -Necrotic Tissue Type Eschar
 -Texture (Natalie-wound Skin Appearance) Assessed,
 Scarring
 -Moisture (Natalie-wound Skin Appearance) Assessed,Dry/
 Scaly
 -Color (Natalie-wound Skin Appearance) Assessed
 -Temperature (Natalie-wound Skin No Abnormality
Appearance) (Pt Warm)
 -Tenderness on Palpation (Natalie-wound No
Skin Appearance) 
 -Ulcer Cleansing Rinsed/
 Irrigated with
 Saline
 -Foul Odor after Cleansing No
 -Anesthetic Used 5% Lidocaine
 Gel
 -Wound Comment(s) not wearing 3m
 today
Right Calf (cm) 29.8
Right Ankle (cm) 20.2

WC - Nurse 2 - General Ulcer CM Notes                 Start:  24 14:30
Freq:                                                 Status: Active        
Protocol:                                                                   
Activity Type Activity Date Activity User E-sign Co-sign Detail
Recorded Client Recorded Date Recorded By   
Document 24 14:58 JF   
[id number] 24 15:04 JF   

  24
  14:58
Wound Center Nurse 2 
#2- R ANTERIOR LE CLUSTER 
 -Correct Patient No
 -Correct Side, Site, Position No
 -Correct Procedure No
 -Procedure Performed No
 -Wound/Ulcer Outcome Not Healed
#1 RT POST LE 
 -Time 14:59
 -Correct Patient Yes
 -Correct Side, Site, Position Yes
 -Correct Procedure Yes
 -Procedure Performed Yes
 -Type of Procedure Debridement
 -Clinical Debridement Subcutaneous
 -Tissue Removed Subcutaneous
 -Post Debridement (cm) - Length 0.5
 -Post Debridement (cm) - Width 0.4
 -Post Debridement (cm) - Depth 0.1
 -Total Square (Post) (cm) 0.20
 -Area of Debridement (cm) - Length 0.5
 -Area of Debridement (cm) - Width 0.4
 -Total Square (Area) (cm) 0.20
 -Tunneling No
 -Undermining/Tunneling No
 -Circular Undermining No
 -Wound/Ulcer Outcome Not Healed
 -Ulcer Cleansing Rinsed/
 Irrigated with
 Saline
 -Foul Odor after Cleansing No
 -Bioengineered Tissue No
 -Bleeding Controlled with Pressure
 -Treatment Response Procedure
 Tolerated Well
 -Offloading No
 -Debridement - Subq, 1st 20sq cm Yes
Pain Scale: 0-10 Numeric 
Is Patient Pain Free? Yes

JACK - Nurse 3 - General Ulcer D/C NN                   Start:  24 14:30
Freq:                                                 Status: Active        
Protocol:                                                                   
Activity Type Activity Date Activity User E-sign Co-sign Detail
Recorded Client Recorded Date Recorded By   
Document 24 15:15 KW   
z 24 15:16 KW   

  24
  15:15
Wound Care Center Nurse 3 
#2- R ANTERIOR LE CLUSTER 
 -Primary Dressing Covered/Secured with Dry Gauze &
 Roll Gauze,
 Secured with
 Tape
#1 RT POST LE 
 -Other Dressing OWN DIANE
 -Primary Dressing Covered/Secured with Dry Gauze &
 Roll Gauze,
 Secured with
 Tape
Right 
 -Multi-Layered Wrap Application Unna Boot -
 Right ($)
Pain Scale: 0-10 Numeric 
Is Patient Pain Free? Yes
WC - Visit Discharge 
Discharge Condition Stable
Ambulatory Status Ambulatory,
 Walker
Medication Reconcilliation completed & No
provided to patient/care provider 
Clinical Summary of Care Provided Yes




Charges/Coding
Procedures Integumentary
[id number]: 24630 May subq tissue 20 sq cm/<

Assessment/Plan
Assessment/Plan
(1) Non-pressure chronic ulcer of calf: 
CODE(S):       L97.209 - Non-pressure chronic ulcer of unspecified calf with unspecified severity
QUALIFIERS:               Laterality: right  Non-pressure ulcer stage: with fat layer exposed  Qualified Code(s): L97.212 - Non-pressure chronic ulcer of right calf with fat layer exposed
(2) Non-pressure chronic ulcer of lower leg: 
CODE(S):       L97.909 - Non-pressure chronic ulcer of unspecified part of unspecified lower leg with unspecified severity
QUALIFIERS:               Laterality: right  Non-pressure ulcer stage: with fat layer exposed  Qualified Code(s): L97.912 - Non-pressure chronic ulcer of unspecified part of right lower leg with fat layer exposed
(3) Wound infection: 
CODE(S):       T14.8XXA - Other injury of unspecified body region, initial encounter; L08.9 - Local infection of the skin and subcutaneous tissue, unspecified
(4) Leg swelling: 
CODE(S):       M79.89 - Other specified soft tissue disorders
(5) Leg edema: 
CODE(S):       R60.0 - Localized edema
(6) Bipolar disorder: 
CODE(S):       F31.9 - Bipolar disorder, unspecified
(7) Depression: 
CODE(S):       F32.A - Depression, unspecified
(8) Dementia: 
CODE(S):       F03.90 - Unspecified dementia, unspecified severity, without behavioral disturbance, psychotic disturbance, mood disturbance, and anxiety
(9) Urinary incontinence: 
CODE(S):       R32 - Unspecified urinary incontinence
(10) Hypertension: 
CODE(S):       I10 - Essential (primary) hypertension
(11) Hypothyroidism: 
CODE(S):       E03.9 - Hypothyroidism, unspecified
(12) History of hip surgery: 
CODE(S):       Z98.890 - Other specified postprocedural states
PLAN: 
Plan
This is a 73-year-old female who presented with an ulceration on the right posterior calf, age-indeterminate.  The patient is a poor historian.  She suffers from dementia, bipolar disorder, and depression.  The etiology of the patient's ulceration 
is uncertain.  The ulceration appears generally healthy and well-vascularized.  There is no sign of infection or cellulitis at this time.  The ulceration has decreased in size since the patient's last visit.  In addition, several small, superficial 
excoriations are noted in the right gaiter area.  A scaly dermatitis is also noted in the right gaiter area, though no erythema or sign of cellulitis.  We are to continue the use of moistened Diane topically, which will be used on the patient's 
ulceration, and newly noted excoriations.  Adaptic will be applied topically as well.  The patient's right lower extremity is to be wrapped with an Unna boot, which will be left in place until the patient's next visit.  This is intended to prevent 
the patient from  picking  or disturbing the ulcer site.  It was noted that the patient's wrap was not in place upon her presentation today, and there is uncertainty as to how long the wrap had been removed.  Furthermore, there is suspicion that the 
excoriations noted today may be the result of the patient scratching or picking at the sites.  The patient is to return in 1 week for reassessment.

Total time: 22 minutes

## 2024-07-04 ENCOUNTER — APPOINTMENT (OUTPATIENT)
Dept: RADIOLOGY | Facility: HOSPITAL | Age: 74
End: 2024-07-04
Payer: MEDICARE

## 2024-07-04 ENCOUNTER — HOSPITAL ENCOUNTER (EMERGENCY)
Facility: HOSPITAL | Age: 74
Discharge: HOME | End: 2024-07-04
Attending: EMERGENCY MEDICINE
Payer: MEDICARE

## 2024-07-04 ENCOUNTER — PHARMACY VISIT (OUTPATIENT)
Dept: PHARMACY | Facility: CLINIC | Age: 74
End: 2024-07-04
Payer: COMMERCIAL

## 2024-07-04 VITALS
HEART RATE: 88 BPM | SYSTOLIC BLOOD PRESSURE: 136 MMHG | HEIGHT: 62 IN | BODY MASS INDEX: 19.51 KG/M2 | TEMPERATURE: 98.2 F | DIASTOLIC BLOOD PRESSURE: 69 MMHG | WEIGHT: 106 LBS | OXYGEN SATURATION: 98 % | RESPIRATION RATE: 16 BRPM

## 2024-07-04 DIAGNOSIS — N39.0 URINARY TRACT INFECTION WITHOUT HEMATURIA, SITE UNSPECIFIED: ICD-10-CM

## 2024-07-04 DIAGNOSIS — T14.8XXA MULTIPLE SKIN TEARS: ICD-10-CM

## 2024-07-04 DIAGNOSIS — S93.402A SPRAIN OF LEFT ANKLE, UNSPECIFIED LIGAMENT, INITIAL ENCOUNTER: ICD-10-CM
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
## 2024-07-04 LAB
ALBUMIN SERPL BCP-MCNC: 3.9 G/DL (ref 3.4–5)
ALP SERPL-CCNC: 72 U/L (ref 33–136)
ALT SERPL W P-5'-P-CCNC: 8 U/L (ref 7–45)
ANION GAP SERPL CALC-SCNC: 9 MMOL/L (ref 10–20)
APPEARANCE UR: CLEAR
AST SERPL W P-5'-P-CCNC: 18 U/L (ref 9–39)
BACTERIA #/AREA URNS AUTO: ABNORMAL /HPF
BASOPHILS # BLD AUTO: 0.02 X10*3/UL (ref 0–0.1)
BASOPHILS NFR BLD AUTO: 0.4 %
BILIRUB SERPL-MCNC: 0.3 MG/DL (ref 0–1.2)
BILIRUB UR STRIP.AUTO-MCNC: NEGATIVE MG/DL
BUN SERPL-MCNC: 22 MG/DL (ref 6–23)
CALCIUM SERPL-MCNC: 8.9 MG/DL (ref 8.6–10.3)
CARDIAC TROPONIN I PNL SERPL HS: 4 NG/L (ref 0–13)
CHLORIDE SERPL-SCNC: 106 MMOL/L (ref 98–107)
CO2 SERPL-SCNC: 31 MMOL/L (ref 21–32)
COLOR UR: COLORLESS
CREAT SERPL-MCNC: 0.56 MG/DL (ref 0.5–1.05)
EGFRCR SERPLBLD CKD-EPI 2021: >90 ML/MIN/1.73M*2
EOSINOPHIL # BLD AUTO: 0.08 X10*3/UL (ref 0–0.4)
EOSINOPHIL NFR BLD AUTO: 1.8 %
ERYTHROCYTE [DISTWIDTH] IN BLOOD BY AUTOMATED COUNT: 12.8 % (ref 11.5–14.5)
GLUCOSE SERPL-MCNC: 88 MG/DL (ref 74–99)
GLUCOSE UR STRIP.AUTO-MCNC: NORMAL MG/DL
HCT VFR BLD AUTO: 37.3 % (ref 36–46)
HGB BLD-MCNC: 11.9 G/DL (ref 12–16)
HOLD SPECIMEN: NORMAL
IMM GRANULOCYTES # BLD AUTO: 0.01 X10*3/UL (ref 0–0.5)
IMM GRANULOCYTES NFR BLD AUTO: 0.2 % (ref 0–0.9)
KETONES UR STRIP.AUTO-MCNC: NEGATIVE MG/DL
LEUKOCYTE ESTERASE UR QL STRIP.AUTO: ABNORMAL
LYMPHOCYTES # BLD AUTO: 1.33 X10*3/UL (ref 0.8–3)
LYMPHOCYTES NFR BLD AUTO: 29.7 %
MCH RBC QN AUTO: 32.5 PG (ref 26–34)
MCHC RBC AUTO-ENTMCNC: 31.9 G/DL (ref 32–36)
MCV RBC AUTO: 102 FL (ref 80–100)
MONOCYTES # BLD AUTO: 0.41 X10*3/UL (ref 0.05–0.8)
MONOCYTES NFR BLD AUTO: 9.2 %
NEUTROPHILS # BLD AUTO: 2.63 X10*3/UL (ref 1.6–5.5)
NEUTROPHILS NFR BLD AUTO: 58.7 %
NITRITE UR QL STRIP.AUTO: ABNORMAL
NRBC BLD-RTO: 0 /100 WBCS (ref 0–0)
PH UR STRIP.AUTO: 7.5 [PH]
PLATELET # BLD AUTO: 192 X10*3/UL (ref 150–450)
POTASSIUM SERPL-SCNC: 4.5 MMOL/L (ref 3.5–5.3)
PROT SERPL-MCNC: 6.1 G/DL (ref 6.4–8.2)
PROT UR STRIP.AUTO-MCNC: NEGATIVE MG/DL
RBC # BLD AUTO: 3.66 X10*6/UL (ref 4–5.2)
RBC # UR STRIP.AUTO: ABNORMAL /UL
RBC #/AREA URNS AUTO: ABNORMAL /HPF
SODIUM SERPL-SCNC: 141 MMOL/L (ref 136–145)
SP GR UR STRIP.AUTO: 1.01
SQUAMOUS #/AREA URNS AUTO: ABNORMAL /HPF
UROBILINOGEN UR STRIP.AUTO-MCNC: NORMAL MG/DL
WBC # BLD AUTO: 4.5 X10*3/UL (ref 4.4–11.3)
WBC #/AREA URNS AUTO: ABNORMAL /HPF

## 2024-07-04 PROCEDURE — 71045 X-RAY EXAM CHEST 1 VIEW: CPT

## 2024-07-04 PROCEDURE — 73610 X-RAY EXAM OF ANKLE: CPT | Mod: LT

## 2024-07-04 PROCEDURE — 70450 CT HEAD/BRAIN W/O DYE: CPT | Performed by: RADIOLOGY

## 2024-07-04 PROCEDURE — 2500000004 HC RX 250 GENERAL PHARMACY W/ HCPCS (ALT 636 FOR OP/ED): Performed by: EMERGENCY MEDICINE

## 2024-07-04 PROCEDURE — 72125 CT NECK SPINE W/O DYE: CPT

## 2024-07-04 PROCEDURE — 2500000001 HC RX 250 WO HCPCS SELF ADMINISTERED DRUGS (ALT 637 FOR MEDICARE OP): Performed by: EMERGENCY MEDICINE

## 2024-07-04 PROCEDURE — 70450 CT HEAD/BRAIN W/O DYE: CPT

## 2024-07-04 PROCEDURE — 72131 CT LUMBAR SPINE W/O DYE: CPT

## 2024-07-04 PROCEDURE — 87086 URINE CULTURE/COLONY COUNT: CPT | Mod: SAMLAB | Performed by: EMERGENCY MEDICINE

## 2024-07-04 PROCEDURE — 81001 URINALYSIS AUTO W/SCOPE: CPT | Performed by: EMERGENCY MEDICINE

## 2024-07-04 PROCEDURE — 72131 CT LUMBAR SPINE W/O DYE: CPT | Performed by: RADIOLOGY

## 2024-07-04 PROCEDURE — RXMED WILLOW AMBULATORY MEDICATION CHARGE

## 2024-07-04 PROCEDURE — 90715 TDAP VACCINE 7 YRS/> IM: CPT | Performed by: EMERGENCY MEDICINE

## 2024-07-04 PROCEDURE — 84075 ASSAY ALKALINE PHOSPHATASE: CPT | Performed by: EMERGENCY MEDICINE

## 2024-07-04 PROCEDURE — 99285 EMERGENCY DEPT VISIT HI MDM: CPT | Mod: 25

## 2024-07-04 PROCEDURE — 72125 CT NECK SPINE W/O DYE: CPT | Performed by: RADIOLOGY

## 2024-07-04 PROCEDURE — 84484 ASSAY OF TROPONIN QUANT: CPT | Performed by: EMERGENCY MEDICINE

## 2024-07-04 PROCEDURE — 96365 THER/PROPH/DIAG IV INF INIT: CPT

## 2024-07-04 PROCEDURE — 73610 X-RAY EXAM OF ANKLE: CPT | Mod: LEFT SIDE | Performed by: RADIOLOGY

## 2024-07-04 PROCEDURE — 85025 COMPLETE CBC W/AUTO DIFF WBC: CPT | Performed by: EMERGENCY MEDICINE

## 2024-07-04 PROCEDURE — 90471 IMMUNIZATION ADMIN: CPT | Performed by: EMERGENCY MEDICINE

## 2024-07-04 PROCEDURE — 71045 X-RAY EXAM CHEST 1 VIEW: CPT | Performed by: RADIOLOGY

## 2024-07-04 PROCEDURE — 36415 COLL VENOUS BLD VENIPUNCTURE: CPT | Performed by: EMERGENCY MEDICINE

## 2024-07-04 RX ORDER — CEFTRIAXONE 1 G/50ML
1 INJECTION, SOLUTION INTRAVENOUS ONCE
Status: COMPLETED | OUTPATIENT
Start: 2024-07-04 | End: 2024-07-04

## 2024-07-04 RX ORDER — CEPHALEXIN 500 MG/1
500 CAPSULE ORAL 2 TIMES DAILY
Qty: 14 CAPSULE | Refills: 0 | Status: SHIPPED | OUTPATIENT
Start: 2024-07-04 | End: 2024-07-11

## 2024-07-04 RX ORDER — BACITRACIN ZINC 500 UNIT/G
1 OINTMENT IN PACKET (EA) TOPICAL ONCE
Status: COMPLETED | OUTPATIENT
Start: 2024-07-04 | End: 2024-07-04

## 2024-07-04 RX ADMIN — TETANUS TOXOID, REDUCED DIPHTHERIA TOXOID AND ACELLULAR PERTUSSIS VACCINE, ADSORBED 0.5 ML: 5; 2.5; 8; 8; 2.5 SUSPENSION INTRAMUSCULAR at 07:59

## 2024-07-04 RX ADMIN — BACITRACIN ZINC 1 APPLICATION: 500 OINTMENT TOPICAL at 10:34

## 2024-07-04 RX ADMIN — CEFTRIAXONE SODIUM 1 G: 1 INJECTION, SOLUTION INTRAVENOUS at 10:33

## 2024-07-04 ASSESSMENT — COLUMBIA-SUICIDE SEVERITY RATING SCALE - C-SSRS
6. HAVE YOU EVER DONE ANYTHING, STARTED TO DO ANYTHING, OR PREPARED TO DO ANYTHING TO END YOUR LIFE?: NO
1. IN THE PAST MONTH, HAVE YOU WISHED YOU WERE DEAD OR WISHED YOU COULD GO TO SLEEP AND NOT WAKE UP?: NO
2. HAVE YOU ACTUALLY HAD ANY THOUGHTS OF KILLING YOURSELF?: NO

## 2024-07-04 ASSESSMENT — PAIN SCALES - GENERAL: PAINLEVEL_OUTOF10: 8

## 2024-07-04 ASSESSMENT — PAIN - FUNCTIONAL ASSESSMENT: PAIN_FUNCTIONAL_ASSESSMENT: 0-10

## 2024-07-04 NOTE — ED PROVIDER NOTES
HPI   Chief Complaint   Patient presents with    Fall     Pt reports losing balance and falling.  Has skin tears noted to left lower leg, and c/o left ankle pain, lower back pain. Unwitnessed fall denies loc or head injury       Limitations to History: Dementia    HPI: 73-year-old female presents with fall from local nursing facility.  Complaining of back and left ankle pain.  Patient cannot verbalize why she fell.  Is unclear if she struck her head.  Pain is sharp in nature.  Denies any headache, vision change, chest pain, shortness of breath, nausea, vomiting, abdominal pain, urinary symptoms.    Additional History Obtained from: EMS    ------------------------------------------------------------------------------------------------------------------------------------------  Physical Exam:    VS: As documented in the triage note and EMR flowsheet from this visit were reviewed.    Appearance: Alert. cooperative,  in no acute distress.   Skin: Two skin tears to the left anterior shin.  Eyes: PERRLA, EOMs intact,  Conjunctiva pink with no redness or exudates.   HENT: Normocephalic, atraumatic. Nares patent. No intraoral lesions.   Neck: Supple, without meningismus. Trachea at midline. No lymphadenopathy.  Pulmonary: Clear bilaterally with good chest wall excursion. No rales, rhonchi or wheezing. No accessory muscle use or stridor.  Cardiac: Regular rate and rhythm, no rubs, murmurs, or gallops.   Abdomen: Abdomen is soft, nontender, and nondistended.  No palpable organomegaly.  No rebound or guarding.  No CVA tenderness. Nonsurgical abdomen.  Genitourinary: Exam deferred.  Musculoskeletal: Full range of motion.  Pulses full and equal. No cyanosis, clubbing, or edema.  Neurological:  Cranial nerves are grossly intact, grossly normal sensation, no weakness, no focal findings identified.  Psychiatric: Appropriate mood and affect.                            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     Spinal cord tumor    Personal history of other benign neoplasm 07/27/2015    History of benign neoplasm of spinal cord     Past Surgical History:   Procedure Laterality Date    CT AORTA AND BILATERAL ILIOFEMORAL RUNOFF ANGIOGRAM W AND/OR WO IV CONTRAST  12/22/2023    CT AORTA AND BILATERAL ILIOFEMORAL RUNOFF ANGIOGRAM W AND/OR WO IV CONTRAST 12/22/2023 [facility]    HEMORRHOID SURGERY  07/23/2015    Hemorrhoidectomy    LUMBAR LAMINECTOMY  07/27/2015    Laminectomy Lumbar    OTHER SURGICAL HISTORY  07/27/2015    Laminectomy Thoracic    OTHER SURGICAL HISTORY  07/27/2015    Laminectomy Excise Intraspinal Tumor Extradural, Lumbar     No family history on file.  Social History     Tobacco Use    Smoking status: Former     Types: Cigarettes    Smokeless tobacco: Never   Substance Use Topics    Alcohol use: Not Currently    Drug use: Never       Physical Exam   ED Triage Vitals [07/04/24 0715]   Temperature Heart Rate Respirations BP   36.8 °C (98.2 °F) 70 18 (!) 117/94      SpO2 Temp src Heart Rate Source Patient Position   -- -- -- --      BP Location FiO2 (%)     -- --       Physical Exam    ED Course & MDM   Diagnoses as of 07/04/24 1200   Fall, initial encounter   Sprain of left ankle, unspecified ligament, initial encounter   Multiple skin tears   Urinary tract infection without hematuria, site unspecified       Medical Decision Making  Labs Reviewed  CBC WITH AUTO DIFFERENTIAL - Abnormal     WBC                           4.5                    nRBC                          0.0                    RBC                           3.66 (*)               Hemoglobin                    11.9 (*)               Hematocrit                    37.3                   MCV                           102 (*)                MCH                            32.5                   MCHC                          31.9 (*)               RDW                           12.8                   Platelets                     192                    Neutrophils %                 58.7                   Immature Granulocytes %, Automated   0.2                    Lymphocytes %                 29.7                   Monocytes %                   9.2                    Eosinophils %                 1.8                    Basophils %                   0.4                    Neutrophils Absolute          2.63                   Immature Granulocytes Absolute, Au*   0.01                   Lymphocytes Absolute          1.33                   Monocytes Absolute            0.41                   Eosinophils Absolute          0.08                   Basophils Absolute            0.02                COMPREHENSIVE METABOLIC PANEL - Abnormal     Glucose                       88                     Sodium                        141                    Potassium                     4.5                    Chloride                      106                    Bicarbonate                   31                     Anion Gap                     9 (*)                  Urea Nitrogen                 22                     Creatinine                    0.56                   eGFR                          >90                    Calcium                       8.9                    Albumin                       3.9                    Alkaline Phosphatase          72                     Total Protein                 6.1 (*)                AST                           18                     Bilirubin, Total              0.3                    ALT                           8                   URINALYSIS WITH REFLEX CULTURE AND MICROSCOPIC - Abnormal     Color, Urine                  Colorless (*)               Appearance, Urine             Clear                  Specific Gravity, Urine        1.006                  pH, Urine                     7.5                    Protein, Urine                NEGATIVE                Glucose, Urine                Normal                 Blood, Urine                  0.1 (1+) (*)               Ketones, Urine                NEGATIVE                Bilirubin, Urine              NEGATIVE                Urobilinogen, Urine           Normal                 Nitrite, Urine                1+ (*)                 Leukocyte Esterase, Urine     250 Armani/µL (*)            MICROSCOPIC ONLY, URINE - Abnormal     WBC, Urine                    21-50 (*)               RBC, Urine                    1-2                    Squamous Epithelial Cells, Urine                          Bacteria, Urine               1+ (*)              TROPONIN I, HIGH SENSITIVITY - Normal     Troponin I, High Sensitivity   4                          Narrative: Less than 99th percentile of normal range cutoff-                  Female and children under 18 years old <14 ng/L; Male <21 ng/L: Negative                  Repeat testing should be performed if clinically indicated.                                     Female and children under 18 years old 14-50 ng/L; Male 21-50 ng/L:                  Consistent with possible cardiac damage and possible increased clinical                   risk. Serial measurements may help to assess extent of myocardial damage.                                     >50 ng/L: Consistent with cardiac damage, increased clinical risk and                  myocardial infarction. Serial measurements may help assess extent of                   myocardial damage.                                      NOTE: Children less than 1 year old may have higher baseline troponin                   levels and results should be interpreted in conjunction with the overall                   clinical context.                                     NOTE: Troponin I testing is performed using a different                    testing methodology at [facility] than at other                   [facility]. Direct result comparisons should only                   be made within the same method.  URINE CULTURE  URINALYSIS WITH REFLEX CULTURE AND MICROSCOPIC         Narrative: The following orders were created for panel order Urinalysis with Reflex Culture and Microscopic.                  Procedure                               Abnormality         Status                                     ---------                               -----------         ------                                     Urinalysis with Reflex C...[[id number]]  Abnormal            Final result                               Extra Urine Gray Tube[[id number]]                            In process                                                   Please view results for these tests on the individual orders.  EXTRA URINE GRAY TUBE  XR ankle left 3+ views   Final Result    No significant interval change since previous exam. No evidence for    displaced acute fracture, dislocation or radiopaque foreign bodies.    If there is persistent pain upon weight-bearing, further evaluation    with CT scan can be performed as clinically warranted for better    assessment.          Osteopenia and degenerative changes.                Signed by: Yvette May 7/4/2024 8:42 AM    Dictation workstation:   [id number]     XR chest 1 view   Final Result    No evidence for acute cardiopulmonary process.                      Signed by: Yvette May 7/4/2024 8:40 AM    Dictation workstation:   [id number]     CT head wo IV contrast   Final Result    Brain atrophy and changes related to chronic small vessel ischemic    disease similar to prior. No evidence of acute cortical infarct or    intracranial hemorrhage.          MACRO:    None          Signed by: Yvette May 7/4/2024 8:35 AM    Dictation workstation:   [id number]     CT cervical spine wo IV contrast   Final Result    1. No  evidence for an acute fracture or subluxation of the cervical    spine.          MACRO:    None          Signed by: Yvette May 7/4/2024 8:37 AM    Dictation workstation:   [id number]     CT lumbar spine wo IV contrast   Final Result    1. No CT evidence for acute injury.    2. Additional detailed findings as above                                  MACRO:    None          Signed by: Yvette May 7/4/2024 8:51 AM    Dictation workstation:   [id number]     Medical Decision Making:    Patient appears well nontoxic.  Vital signs within normal limits.  Tetanus updated.  Found to have urinary tract infection.  Treated with Rocephin.  Lab work unremarkable with normal imaging.  Patient be discharged back to skilled nursing facility on Keflex.  Wounds were cleansed, treated with bacitracin and covered.  Stable at time of discharge.    Differential Diagnoses Considered: Left ankle sprain versus fracture, UTI, electrolyte abnormality, volume depletion    Independent Interpretation of Studies:  I independently interpreted: CT brain shows no intracranial hemorrhage.  CT cervical spine without fracture or dislocation.  Chest x-ray without pneumothorax.  CT lumbar spine without fracture or dislocation.  Left ankle x-ray without fracture or dislocation.    Escalation of Care:  Appropriate for discharge and follow-up with primary care.    Prescription Drug Consideration: Oral Keflex.          Procedure  ECG 12 lead    Performed by: Mateusz Warren DO  Authorized by: Mateusz Warren DO    ECG interpreted by ED Physician in the absence of a cardiologist: yes    Comments:      EKG interpreted by Dr. Mateusz Warren: Normal sinus rhythm at 68 bpm.  VA interval 138 ms.  QTc of 442 ms.  No evidence of ST elevation or depression at this time.       Mateusz Warren DO  07/04/24 1202

## 2024-07-05 ENCOUNTER — APPOINTMENT (OUTPATIENT)
Dept: CARDIOLOGY | Facility: HOSPITAL | Age: 74
End: 2024-07-05
Payer: MEDICARE

## 2024-07-05 LAB
ATRIAL RATE: 68 BPM
P AXIS: 44 DEGREES
P OFFSET: 194 MS
P ONSET: 156 MS
PR INTERVAL: 138 MS
Q ONSET: 225 MS
QRS COUNT: 12 BEATS
QRS DURATION: 76 MS
QT INTERVAL: 416 MS
QTC CALCULATION(BAZETT): 442 MS
QTC FREDERICIA: 434 MS
R AXIS: 19 DEGREES
T AXIS: 76 DEGREES
T OFFSET: 433 MS
VENTRICULAR RATE: 68 BPM

## 2024-07-05 PROCEDURE — 93005 ELECTROCARDIOGRAM TRACING: CPT

## 2024-07-07 LAB — BACTERIA UR CULT: ABNORMAL

## 2024-07-08 ENCOUNTER — TELEPHONE (OUTPATIENT)
Dept: PHARMACY | Facility: HOSPITAL | Age: 74
End: 2024-07-08
Payer: MEDICARE

## 2024-07-08 NOTE — PROGRESS NOTES
EDPD Note: Lab/Chart Reviewed    Reviewed Ms. Mya Daniels 's chart regarding a positive urine culture/result that was taken during their recent emergency room visit. The patient was transferred back to their rehab/LTC facility .Therefore, I have faxed this information to [place] [phone] ([phone] or [phone]) at fax number [phone] .    Susceptibility data from last 90 days.  Collected Specimen Info Organism Ampicillin Cefazolin Cefazolin (uncomplicated UTIs only) Ciprofloxacin Gentamicin Nitrofurantoin Piperacillin/Tazobactam Tetracycline Trimethoprim/Sulfamethoxazole   07/04/24 Urine from Clean Catch/Voided Proteus mirabilis  S  S  S  S  S  R  S  R  S       No further follow up needed from EDPD Team.     Yanira Garrisno, PharmD

## 2024-07-09 ENCOUNTER — HOSPITAL ENCOUNTER (OUTPATIENT)
Dept: HOSPITAL 100 - WC | Age: 74
LOS: 2 days | Discharge: HOME | End: 2024-07-11
Payer: MEDICARE

## 2024-07-09 VITALS
HEART RATE: 89 BPM | RESPIRATION RATE: 18 BRPM | SYSTOLIC BLOOD PRESSURE: 117 MMHG | DIASTOLIC BLOOD PRESSURE: 79 MMHG | TEMPERATURE: 99.86 F

## 2024-07-09 VITALS — BODY MASS INDEX: 19.5 KG/M2

## 2024-07-09 DIAGNOSIS — M79.89: ICD-10-CM

## 2024-07-09 DIAGNOSIS — Z87.891: ICD-10-CM

## 2024-07-09 DIAGNOSIS — E03.9: ICD-10-CM

## 2024-07-09 DIAGNOSIS — L97.912: ICD-10-CM

## 2024-07-09 DIAGNOSIS — Z79.899: ICD-10-CM

## 2024-07-09 DIAGNOSIS — Z79.82: ICD-10-CM

## 2024-07-09 DIAGNOSIS — Z98.890: ICD-10-CM

## 2024-07-09 DIAGNOSIS — R60.0: ICD-10-CM

## 2024-07-09 DIAGNOSIS — F31.9: ICD-10-CM

## 2024-07-09 DIAGNOSIS — B95.61: ICD-10-CM

## 2024-07-09 DIAGNOSIS — F03.90: ICD-10-CM

## 2024-07-09 DIAGNOSIS — I10: ICD-10-CM

## 2024-07-09 DIAGNOSIS — R32: ICD-10-CM

## 2024-07-09 DIAGNOSIS — L97.212: Primary | ICD-10-CM

## 2024-07-09 PROCEDURE — 99213 OFFICE O/P EST LOW 20 MIN: CPT

## 2024-07-09 PROCEDURE — 11042 DBRDMT SUBQ TIS 1ST 20SQCM/<: CPT

## 2024-07-09 PROCEDURE — 29580 STRAPPING UNNA BOOT: CPT

## 2024-07-09 PROCEDURE — 29581 APPL MULTLAYER CMPRN SYS LEG: CPT

## 2024-07-09 PROCEDURE — 97597 DBRDMT OPN WND 1ST 20 CM/<: CPT

## 2024-07-09 PROCEDURE — G0463 HOSPITAL OUTPT CLINIC VISIT: HCPCS

## 2024-07-09 NOTE — PCM.WC.HP
History of Present Illness
Date of Service: 24
Chief Complaint: Ulceration of the right posterior calf
History of Wound: This is a 73-year-old female who is a resident of [facility].  She suffers from bipolar disorder, depression, and dementia.  She is a poor historian.  She presented with an ulceration on the right posterior 
calf, age indeterminate.  The patient is known to be a   .  She has been advised in the past to wear compression stockings, but she refuses, and is not compliant.  She has recently been prescribed cefadroxil 500 mg p.o. twice daily, for a 
10-day course.  This was prescribed as a result of cultures of her ulceration which were performed on May 24, 2024, which revealed the presence of Staph aureus.  It is said that the patient suffers from swelling and edema in her lower extremities.  
She sleeps on a flat mattress at night.  However, she is not very active, and spends a good deal of each day sitting.  She has a history of hypertension and hypothyroidism.  She otherwise denies a history of myocardial infarction, cerebrovascular 
accident, diabetes mellitus, renal disease, pulmonary disease, and hyperlipidemia.  The patient is of relatively normal body habitus, with a BMI of 19.5. 
 


[facility]
Medical History (Reviewed 24 @ 14:27 by Dr. Evangelista Carvajal MD)

Non-pressure chronic ulcer of calf
Wound infection
Leg edema
Leg swelling
Hypothyroidism
Hypertension
Urinary incontinence
Dementia
Depression
Bipolar disorder
Non-pressure chronic ulcer of lower leg


Home Medications

?Medication ?Instructions ?Recorded ?Last Taken ?Type
Depakote 625 cap PO QHS 24 Unknown History
aspirin 81 mg capsule 81 mg PO DAILY 24 Unknown History
cholecalciferol (vitamin D3) 10 25 mcg PO DAILY 24 Unknown History
mcg/mL (400 unit/mL) oral drops    
(Pedia D-Konstantin)    
donepezil 10 mg tablet 5 mg PO DAILY 24 Unknown History
donepezil 5 mg tablet (Aricept) 5 mg PO QHS 24 Unknown History
hydroxyzine HCl 25 mg tablet 25 mg PO Q8H PRN anxiety 24 Unknown History
levothyroxine 50 mcg tablet 50 mcg PO DAILY 24 Unknown History
loperamide 2 mg capsule 2 mg PO Q6H PRN loose stool 24 Unknown History
melatonin 3 mg capsule 3 mg PO QHS 24 Unknown History
mirtazapine 30 mg tablet 45 mg PO QHS 24 Unknown History
mirtazapine 45 mg tablet 45 mg PO QHS 24 Unknown History




Allergy/AdvReac Type Severity Reaction Status Date / Time
carbamazepine Allergy Intermediate PT UNSURE Verified 24 13:16
   OF REACTION  


Surgical History (Reviewed 24 @ 14:27 by Dr. Evangelista Carvajal MD)

History of hip surgery


Social History (Reviewed 24 @ 14:27 by Dr. Evangelista Carvajal MD)
Smoking Status:  Former smoker 



Vital Signs
Vital Signs
Vital Signs: 


 24
13:41
Temperature 99.8 F H
Temperature Source Temporal
Pulse Rate 89
Respiratory Rate 18
Blood Pressure 117/79
Blood Pressure Mean 91
Blood Pressure Source Monitor
Blood Pressure Position Sitting
Blood Pressure Location Left Arm
Oxygen Delivery Method Room Air

Weight

Weight:                        107 lb                                            
Body Mass Index (BMI)          19.5                                              




Physical Exam
Const
alert, no apparent distress, average body habitus and well nourished
Constitutional Narrative: 
The patient is alert and interactive, though confused.  The patient is of relatively normal body habitus, with a BMI of 19.5.
General Appearance: cooperative, comfortable, well kempt and well developed
Orientation / Consciousness: awake, confused and disoriented
HEENT
normocephalic and head/scalp atraumatic
Head and Scalp: normal to inspection, normocephalic and atraumatic
Face and Sinus: normal facial exam
External Ear: external ears normal
Eyes
EOMs intact bilaterally
General Eye: normal appearance of both eyes
Resp
normal respiratory effort, normal air movement, no retractions and no use of accessory muscles
Effort and Inspection: able to speak in complete sentences
Extremity
no calf tenderness
General Extremity: Negative for clubbing or cyanosis
Skin
Wound Narrative: 
A small eschar is noted on the patient's right posterior calf.  There are 2 other small eschars nearby on the right calf.  Upon selective debridement, it is apparent that there is healing at each of the sites, and there are no remaining open wounds 
or ulcerations.  There is no significant swelling or edema noted in the patient's lower extremities at this time.  
Neuro
CN's II-XII intact bilaterally, moves all extremities and no focal motor deficits
Sensorium / Orientation: awake, alert, orientation impaired and confused
Psych
Appearance: grossly normal and appropriate
Attitude: calm
Activity / Motor Behavior: appropriate eye contact
Speech: normal speech
Mood & Affect: euthymic mood
Thought Process: normal thought process
Thought Content: normal thought content
Attention / Concentration: attention grossly intact

Debridement Note
Debridement Note
Wound debrided: Right posterior calf eschar and 2 nearby eschars
Laterality: Right
Type of Debridement: Selective debridement
Anesthesia Used: 5% Lidocaine Gel
Depth: Down to and including healthy tissue and in the subcutaneous layer
Percentage of wound debrided: 100
Instrument Used: 5mm curette
Tissue Removed: Eschar
Amount of bleeding with debridement: None
Patient tolerated procedure: Patient tolerated procedure well
Debridement Free Text: Using a 5 mm sterile curette, the eschar at each of the sites was removed, revealing a healed ulcer beneath.
Post-Debridement Measurements and Additional Note: 
Post-Debridement Measurements/Treatment

 - Nurse 1 - General Ulcer Assessment               Start:  24 14:30
Freq:                                                 Status: Active        
Protocol:  [email or web address]                                                        
Activity Type Activity Date Activity User E-sign Co-sign Detail
Recorded Client Recorded Date Recorded By   
Document 24 14:30 KW   
z 24 14:36 KW   
Document 24 13:41 DS   
1 24 13:54 DS   

  24
  14:30 13:41
 - Today's Visit Information  
Type of service Follow-up Visit Follow-up Visit
 (Physician/CNP (Physician/CNP
 ) )
Arrival Mode Ambulatory, Ambulatory
 Walker 
Transfer Assistance None 
Accompanied by caregiver 
Patient Identification Verified (Name & Yes 
)  
Patient Requires Transmission-Based No 
Precautions  
Safety Precautions  Fall Prevention
Height and Weight  
Body Mass Index (BMI) 19.5 19.5
BMI Classification Normal Normal
Vital Signs  
Temperature (97.8 F-99.1 F) 98.1 F 99.8 F H
Temperature Source Temporal Temporal
Pulse Rate () 70 89
Pulse Location Monitor Monitor
Respiratory Rate (12-18) 16 18
Respiratory rate source Observation Observation
Oxygen Delivery Method Room Air Room Air
Blood Pressure (90//80) 166/64 H 117/79
Blood Pressure Mean 98 91
Source Monitor Monitor
Position Sitting Sitting
Blood Pressure Location Left Arm Left Arm
History Since Last Visit- (Skip if this  
is Patient's initial visit)  
Have you changed medications since your No No
last visit?  
Any new allergies or adverse reactions No No
Had a fall/change in ADL's that may No No
increase risk of falls  
Signs or symptoms of abuse and/or No 
neglect since last visit  
Have you been in the hospital since your No No
last visit?  
Has dressing in place as prescribed Yes Yes
Has compression in place as prescribed No No
Has offloadiing in place as prescribed N/A No
Experienced any changes in pain level or No No
management  
Left Footwear Regular Shoe Regular Shoe
Right Footwear Regular Shoe Regular Shoe
Pain Scale: 0-10 Numeric  
Is Patient Pain Free? Yes Yes

WC - Nurse 1 - General Ulcer Measurement              Start:  24 14:30
Freq:                                                 Status: Active        
Protocol:                                                                   
Activity Type Activity Date Activity User E-sign Co-sign Detail
Recorded Client Recorded Date Recorded By   
Document 24 14:30 KW   
z 24 14:36 KW   
Document 24 13:41 DS   
1 24 13:54 DS   
Edit Result 24 13:41 DS   (1)
[id number] 24 13:59 DS   

(1) #1 RT POST LE                       
    - Necrosis Amt                      => Large (%)
    - Necrotic Tissue Type              => Eschar
    #2- R ANTERIOR LE CLUSTER           
    - Current Size (cm) - Length  1.0   => 9.0
    - Current Size (cm) - Width   1.0   => 0.7
    - Total Square Cm             1.00  => 6.30
    - Photo Taken                       => No
    - Necrosis Amt                      => Large (%)
    - Necrotic Tissue Type              => Eschar
  24
  14:30 13:41
Wound Center Nurse 1  
#1 RT POST LE  
 -Combined with other wound No No
 -Current Size (cm) - Length 0.3 1.0
 -Current Size (cm) - Width 0.2 1.0
 -Current Size (cm) - Depth 0.1 0.1
 -Total Square Cm 0.06 1.00
 -Date of Last Picture (Recall this 24 
field)  
 -Photo Taken Yes 
 -Epithelialization None Present 
 -Tunneling No 
 -Undermining/Tunneling No 
 -Circular Undermining No 
 -Exudate Amt None Present 
 -Wound Margin Distinct, Distinct,
 Outline Outline
 Attached Attached
 -Granulation Amt None Present (0 
 %) 
 -Slough/Fibrin Yes 
 -Necrosis Amt Large (%) Large (%)
 -Necrotic Tissue Type Eschar Eschar
 -Texture (Natalie-wound Skin Appearance) Assessed, Assessed
 Scarring 
 -Moisture (Natalie-wound Skin Appearance) Assessed,Dry/ Assessed
 Scaly 
 -Color (Natalie-wound Skin Appearance) Assessed Assessed
 -Temperature (Natalie-wound Skin No Abnormality No Abnormality
Appearance) (Pt Warm) (Pt Warm)
 -Tenderness on Palpation (Natalie-wound No No
Skin Appearance)  
 -Ulcer Cleansing Rinsed/ Soap and Water
 Irrigated with 
 Saline 
 -Foul Odor after Cleansing No 
 -Anesthetic Used 5% Lidocaine 5% Lidocaine
 Gel Gel
 -Wound Comment(s) not wearing 3m 
 today 
#2- R ANTERIOR LE CLUSTER  
 -Combined with other wound No 
 -Current Size (cm) - Length 8.2 9.0
 -Current Size (cm) - Width 5.5 0.7
 -Current Size (cm) - Depth 0.1 0.1
 -Total Square Cm 45.10 6.30
 -Date of Last Picture (Recall this 24 
field)  
 -Photo Taken Yes No
 -Epithelialization None Present 
 -Tunneling No No
 -Undermining/Tunneling No No
 -Circular Undermining No No
 -Exudate Amt Medium 
 -Exudate Type Serosanguineous 
 -Wound Margin Distinct, Distinct,
 Outline Outline
 Attached Attached
 -Granulation Amt Large (%) 
 -Granulation Quality Red 
 -Slough/Fibrin No 
 -Necrosis Amt None Present (0 Large (%)
 %) 
 -Necrotic Tissue Type  Eschar
 -Texture (Natalie-wound Skin Appearance) Assessed Assessed
 -Moisture (Natalie-wound Skin Appearance) Assessed Assessed
 -Color (Natalie-wound Skin Appearance) Assessed Assessed
 -Temperature (Natalie-wound Skin No Abnormality No Abnormality
Appearance) (Pt Warm) (Pt Warm)
 -Tenderness on Palpation (Natalie-wound No No
Skin Appearance)  
 -Ulcer Cleansing Rinsed/ Soap and Water
 Irrigated with 
 Saline 
 -Foul Odor after Cleansing No 
 -Anesthetic Used 5% Lidocaine 5% Lidocaine
 Gel Gel
Right Calf (cm) 29.8 
Right Ankle (cm) 20.2 

WC - Nurse 2 - General Ulcer CM Notes                 Start:  24 14:30
Freq:                                                 Status: Active        
Protocol:                                                                   
Activity Type Activity Date Activity User E-sign Co-sign Detail
Recorded Client Recorded Date Recorded By   
Document 24 14:58    
[id number] 24 15:04 JF   
Document 24 14:07 JF   
0000 24 14:08 JF   
Edit Result 24 14:07 JF   (1)
0000 24 14:12 JF   

(1) #2- R ANTERIOR LE CLUSTER                          
    - Correct Patient                  No              => Yes
    - Correct Side, Site, Position     No              => Yes
    - Correct Procedure                No              => Yes
    - Procedure Performed              No              => Yes
    - Type of Procedure                                => Debridement
    - Clinical Debridement                             => Epidermis / Dermis
    - Tissue Removed                                   => Epidermis,Dermis
    - Tunneling                                        => No
    - Undermining/Tunneling                            => No
    - Circular Undermining                             => No
    - Wound/Ulcer Outcome              Healed-         => Not Healed
                                       Epithelialized  => 
    - Debridement - Open, 1st 20sq cm                  => Yes
  24
  14:58 14:07
Wound Center Nurse 2  
#1 RT POST LE  
 -Time 14:59 
 -Correct Patient Yes No
 -Correct Side, Site, Position Yes No
 -Correct Procedure Yes No
 -Procedure Performed Yes No
 -Type of Procedure Debridement 
 -Clinical Debridement Subcutaneous 
 -Tissue Removed Subcutaneous 
 -Post Debridement (cm) - Length 0.5 0
 -Post Debridement (cm) - Width 0.4 0
 -Post Debridement (cm) - Depth 0.1 0
 -Total Square (Post) (cm) 0.20 0
 -Area of Debridement (cm) - Length 0.5 0
 -Area of Debridement (cm) - Width 0.4 0
 -Total Square (Area) (cm) 0.20 0
 -Tunneling No 
 -Undermining/Tunneling No 
 -Circular Undermining No 
 -Wound/Ulcer Outcome Not Healed Healed-
  Epithelialized
 -Ulcer Cleansing Rinsed/ 
 Irrigated with 
 Saline 
 -Foul Odor after Cleansing No 
 -Bioengineered Tissue No 
 -Bleeding Controlled with Pressure 
 -Treatment Response Procedure 
 Tolerated Well 
 -Offloading No 
 -Debridement - Subq, 1st 20sq cm Yes 
#2- R ANTERIOR LE CLUSTER  
 -Correct Patient No Yes
 -Correct Side, Site, Position No Yes
 -Correct Procedure No Yes
 -Procedure Performed No Yes
 -Type of Procedure  Debridement
 -Clinical Debridement  Epidermis /
  Dermis
 -Tissue Removed  Epidermis,
  Dermis
 -Post Debridement (cm) - Length  0
 -Post Debridement (cm) - Width  0
 -Post Debridement (cm) - Depth  0
 -Total Square (Post) (cm)  0
 -Area of Debridement (cm) - Length  0
 -Area of Debridement (cm) - Width  0
 -Total Square (Area) (cm)  0
 -Tunneling  No
 -Undermining/Tunneling  No
 -Circular Undermining  No
 -Wound/Ulcer Outcome Not Healed Not Healed
 -Debridement - Open, 1st 20sq cm  Yes
Pain Scale: 0-10 Numeric  
Is Patient Pain Free? Yes Yes

 - Nurse 3 - General Ulcer D/C NN                   Start:  24 14:30
Freq:                                                 Status: Active        
Protocol:                                                                   
Activity Type Activity Date Activity User E-sign Co-sign Detail
Recorded Client Recorded Date Recorded By   
Document 24 15:15 KW   
z 24 15:16 KW   

  24
  15:15
Wound Care Center Nurse 3 
#1 RT POST LE 
 -Other Dressing OWN DIANE
 -Primary Dressing Covered/Secured with Dry Gauze &
 Roll Gauze,
 Secured with
 Tape
#2- R ANTERIOR LE CLUSTER 
 -Primary Dressing Covered/Secured with Dry Gauze &
 Roll Gauze,
 Secured with
 Tape
Right 
 -Multi-Layered Wrap Application Unna Boot -
 Right ($)
Pain Scale: 0-10 Numeric 
Is Patient Pain Free? Yes
WC - Visit Discharge 
Discharge Condition Stable
Ambulatory Status Ambulatory,
 Walker
Medication Reconcilliation completed & No
provided to patient/care provider 
Clinical Summary of Care Provided Yes




Charges/Coding
Visit Charges
Office Visits / Consults: [id number] OV L3 Est 20min

Assessment/Plan
Assessment/Plan
(1) Non-pressure chronic ulcer of calf: 
CODE(S):       L97.209 - Non-pressure chronic ulcer of unspecified calf with unspecified severity
QUALIFIERS:               Laterality: right  Non-pressure ulcer stage: with fat layer exposed  Qualified Code(s): L97.212 - Non-pressure chronic ulcer of right calf with fat layer exposed
(2) Non-pressure chronic ulcer of lower leg: 
CODE(S):       L97.909 - Non-pressure chronic ulcer of unspecified part of unspecified lower leg with unspecified severity
QUALIFIERS:               Laterality: right  Non-pressure ulcer stage: with fat layer exposed  Qualified Code(s): L97.912 - Non-pressure chronic ulcer of unspecified part of right lower leg with fat layer exposed
(3) Wound infection: 
CODE(S):       T14.8XXA - Other injury of unspecified body region, initial encounter; L08.9 - Local infection of the skin and subcutaneous tissue, unspecified
(4) Leg swelling: 
CODE(S):       M79.89 - Other specified soft tissue disorders
(5) Leg edema: 
CODE(S):       R60.0 - Localized edema
(6) Bipolar disorder: 
CODE(S):       F31.9 - Bipolar disorder, unspecified
(7) Depression: 
CODE(S):       F32.A - Depression, unspecified
(8) Dementia: 
CODE(S):       F03.90 - Unspecified dementia, unspecified severity, without behavioral disturbance, psychotic disturbance, mood disturbance, and anxiety
(9) Urinary incontinence: 
CODE(S):       R32 - Unspecified urinary incontinence
(10) Hypertension: 
CODE(S):       I10 - Essential (primary) hypertension
(11) Hypothyroidism: 
CODE(S):       E03.9 - Hypothyroidism, unspecified
(12) History of hip surgery: 
CODE(S):       Z98.890 - Other specified postprocedural states
PLAN: 
Plan
This is a 73-year-old female who presented with an ulceration on the right posterior calf, age-indeterminate.  The patient is a poor historian.  She suffers from dementia, bipolar disorder, and depression.  The etiology of the patient's ulceration 
is uncertain.  The ulceration appeared generally healthy and well-vascularized.  There was no sign of infection or cellulitis at this time.  The ulceration and nearby excoriations are now completely healed and epithelialized.  The patient is to be 
discharged, and will follow-up henceforth on an as-needed basis.  Because she is a   , we are to wrap the right lower extremity with a 3M 2 layer compression wrap, which is to remain in place for about 3 days.  Thereafter, patient is to wear 
graduated compression stockings of 15 to 20 mmHg compression, which are to be donned on a daily basis.  She has been provided a prescription for such stockings, which are to be obtained at a local medical supply store.  Because of the ulceration and 
all associated excoriations are completely healed, the patient will be discharged.  

Total time: 22 minutes

## 2024-08-26 NOTE — HP.PCM_ITS
History of Present Illness    
Date of Service: 24    
Chief Complaint: Ulceration of the right posterior calf    
History of Wound: This is a 73-year-old female who is a resident of [facility].  She suffers from bipolar disorder, depression, and   
dementia.  She is a poor historian.  She presented with an ulceration on the   
right posterior calf, age indeterminate.  The patient is known to be a   .  
 She has been advised in the past to wear compression stockings, but she refuse  
s, and is not compliant.  She has recently been prescribed cefadroxil 500 mg   
p.o. twice daily, for a 10-day course.  This was prescribed as a result of   
cultures of her ulceration which were performed on May 24, 2024, which revealed   
the presence of Staph aureus.  It is said that the patient suffers from swelling  
and edema in her lower extremities.  She sleeps on a flat mattress at night.    
However, she is not very active, and spends a good deal of each day sitting.    
She requires a walker for ambulation.  She has a history of hypertension and   
hypothyroidism.  She otherwise denies a history of myocardial infarction,   
cerebrovascular accident, diabetes mellitus, renal disease, pulmonary disease,   
and hyperlipidemia.  The patient is of relatively normal body habitus, with a   
BMI of 19.5.     
     
    
    
Critical access hospital    
Medical History (Reviewed 24 @ 16:01 by Dr. Evangelista Carvajal MD)    
    
Non-pressure chronic ulcer of calf    
Wound infection    
Leg edema    
Leg swelling    
Hypothyroidism    
Hypertension    
Urinary incontinence    
Dementia    
Depression    
Bipolar disorder    
Non-pressure chronic ulcer of lower leg    
    
    
                                Home Medications    
    
    
    
?Medication ?Instructions ?Recorded ?Last Taken ?Type    
     
Depakote 625 cap PO QHS 24 Unknown History    
     
aspirin 81 mg capsule 81 mg PO DAILY 24 Unknown History    
     
cholecalciferol (vitamin D3) 10 25 mcg PO DAILY 24 Unknown History    
    
mcg/mL (400 unit/mL) oral drops        
    
(Pedia D-Konstantin)        
     
donepezil 10 mg tablet 5 mg PO DAILY 24 Unknown History    
     
donepezil 5 mg tablet (Aricept) 5 mg PO QHS 24 Unknown History    
     
hydroxyzine HCl 25 mg tablet 25 mg PO Q8H PRN anxiety 24 Unknown History    
     
levothyroxine 50 mcg tablet 50 mcg PO DAILY 24 Unknown History    
     
loperamide 2 mg capsule 2 mg PO Q6H PRN loose stool 24 Unknown History    
     
melatonin 3 mg capsule 3 mg PO QHS 24 Unknown History    
     
mirtazapine 30 mg tablet 45 mg PO QHS 24 Unknown History    
     
mirtazapine 45 mg tablet 45 mg PO QHS 24 Unknown History    
    
    
    
                                            
    
    
    
Allergy/AdvReac Type Severity Reaction Status Date / Time    
     
carbamazepine Allergy Intermediate PT UNSURE Verified 24 13:16    
    
   OF REACTION      
    
    
    
Surgical History (Reviewed 24 @ 16:01 by Dr. Evangelista Carvajal MD)    
    
History of hip surgery    
    
    
Social History (Reviewed 24 @ 16:01 by Dr. Evangelista Carvajal MD)    
Smoking Status:  Former smoker     
    
    
    
Vital Signs    
Vital Signs    
Vital Signs:     
                                            
    
    
    
 24    
14:04    
     
Pulse Rate 105 H    
     
Respiratory Rate 16    
     
Blood Pressure 180/77 H    
     
Blood Pressure Mean 111    
     
Blood Pressure Source Monitor    
     
Blood Pressure Position Semi-Fowlers    
     
Blood Pressure Location Left Arm    
     
Oxygen Delivery Method Room Air    
    
    
                                     Weight    
    
    
    
Weight:                        107 lb                                             
    
     
Body Mass Index (BMI)          19.5                                               
    
    
    
    
    
    
Physical Exam    
Const    
alert, no apparent distress, average body habitus and well nourished    
Constitutional Narrative:     
The patient is alert and interactive, though confused and unable to state her   
age and responds inappropriately to routine questions.  The patient is of   
relatively normal body habitus, with a BMI of 19.5.    
General Appearance: cooperative, comfortable, well kempt and well developed    
Orientation / Consciousness: awake, confused and disoriented    
HEENT    
normocephalic and head/scalp atraumatic    
Head and Scalp: normal to inspection, normocephalic and atraumatic    
Face and Sinus: normal facial exam    
External Ear: external ears normal    
Eyes    
EOMs intact bilaterally    
General Eye: normal appearance of both eyes    
Resp    
normal respiratory effort, normal air movement, no retractions and no use of   
accessory muscles    
Effort and Inspection: able to speak in complete sentences    
Extremity    
no calf tenderness    
General Extremity: Negative for clubbing or cyanosis    
Skin    
Wound Narrative:     
An ulceration is noted on the patient's right posterior calf.  It is pink and   
healthy in appearance, with no sign of infection or cellulitis.  There is a   
small amount of bioburden.  Dimensions are documented elsewhere.  The ulceration  
appears to be smaller than that noted previously.  There is no significant   
swelling or edema noted in the patient's lower extremities at this time.      
Neuro    
CN's II-XII intact bilaterally, moves all extremities and no focal motor   
deficits    
Sensorium / Orientation: awake, alert, orientation impaired and confused    
Psych    
Appearance: grossly normal and appropriate    
Attitude: calm    
Activity / Motor Behavior: appropriate eye contact    
Speech: normal speech    
Mood & Affect: euthymic mood    
Thought Process: normal thought process    
Thought Content: normal thought content    
Attention / Concentration: attention grossly intact    
    
Debridement Note    
Debridement Note    
Wound debrided: Right posterior calf    
Laterality: Right    
Type of Debridement: Excisional debridement    
Anesthesia Used: 5% Lidocaine Gel    
Depth: Down to and including healthy tissue and in the subcutaneous layer    
Percentage of wound debrided: 100    
Instrument Used: 3mm curette    
Tissue Removed: Bioburden and senescent material    
Severity: Fat Layer Exposed    
Amount of bleeding with debridement: Mild    
Bleeding Controlled with: Compression and gauze    
Patient tolerated procedure: Patient tolerated procedure well    
Post-Debridement Measurements and Additional Note:     
Post-Debridement Measurements/Treatment    
    
 - Nurse 1 - General Ulcer Assessment               Start:  24 13:20    
Freq:                                                 Status: Active            
Protocol:  .MIO                                                            
    
    
Activity Type Activity Date Activity User E-sign Co-sign Detail    
    
Recorded Client Recorded Date Recorded By      
     
Document 24 13:20 KW       
    
wound center 24 13:41 KW       
     
Document 24 11:58 RB       
    
wound center 24 12:00 RB       
     
Document 24 14:53 KW       
    
wound center 24 15:07 KW       
     
Document 24 10:29 RB       
    
wound 24 10:32 RB       
     
Document 24 14:04        
    
[id number] 24 14:11        
    
    
    
    
  24    
    
  13:20 11:58 14:53    
     
 - Today's Visit Information       
     
Type of service Initial Visit Follow-up Visit Follow-up Visit    
    
  (Physician/CNP (Physician/CNP    
    
  ) )    
     
Arrival Mode Ambulatory, Ambulatory, Ambulatory,    
    
 Walker Walker Walker    
     
Transfer Assistance  None     
     
Accompanied by CARE GIVER      
     
Patient Identification Verified (Name & Yes Yes Yes    
    
)       
     
Patient Requires Transmission-Based  No     
    
Precautions       
     
Height and Weight       
     
Height 5 ft 2 in      
     
Weight 107 lb      
     
Weight in Pounds 107.0 lbs      
     
Body Mass Index (BMI) 19.5 19.5 19.5    
     
BMI Classification Normal Normal Normal    
     
BSA - Darrell 1.47      
     
Vital Signs       
     
Temperature (97.8 F-99.1 F) 97.7 F L 97.7 F L 96.1 F L    
     
Temperature Source Temporal Temporal Temporal    
     
Pulse Rate () 79 73 76    
     
Pulse Location Monitor Monitor Monitor    
     
Respiratory Rate (12-18) 18 18 18    
     
Respiratory rate source Observation Observation Observation    
     
Oxygen Delivery Method Room Air  Room Air    
     
Blood Pressure (90//80) 148/57 H 150/69 H 151/61 H    
     
Blood Pressure Mean 87 96 91    
     
Source Monitor Monitor Monitor    
     
Position Semi-Fowlers Semi-Fowlers Semi-Fowlers    
     
Blood Pressure Location Left Arm Left Arm Left Arm    
     
History Since Last Visit- (Skip if this       
    
is Patient's initial visit)       
     
Have you changed medications since your  No No    
    
last visit?       
     
Any new allergies or adverse reactions  No No    
     
Had a fall/change in ADL's that may  No No    
    
increase risk of falls       
     
Signs or symptoms of abuse and/or  No No    
    
neglect since last visit       
     
Have you been in the hospital since your  No No    
    
last visit?       
     
Has dressing in place as prescribed  Yes Yes    
     
Has compression in place as prescribed  Yes Yes    
     
Has offloadiing in place as prescribed  No N/A    
     
Experienced any changes in pain level or  No No    
    
management       
     
Left Footwear Regular Shoe  Regular Shoe    
     
Right Footwear Regular Shoe  Regular Shoe    
     
Pain Scale: 0-10 Numeric       
     
Is Patient Pain Free? Yes Yes Yes    
     
Lower Extremity Assessment/ Foot       
    
Assessment/ Toe Nail Assessment       
     
Right       
     
 -Posterior Tibial Doppler Multiphasic      
     
 -Dorsalis Pedis Doppler Multiphasic      
     
 -Extremity Color Red      
     
 -Hair Growth on Legs Yes      
     
 -Hair Growth on Toes No      
     
 -Temperature of Extremity Warm      
     
 -Capillary Refill Less than 3      
    
 Seconds      
     
 -Thick No      
     
 -Discolored No      
     
 -Deformed No      
     
 -Improper Length & Hygeine No      
     
Left       
     
 -Posterior Tibial Doppler Multiphasic      
     
 -Dorsalis Pedis Doppler Monophasic      
     
 -Hair Growth on Legs Yes      
     
 -Hair Growth on Toes No      
     
 -Temperature of Extremity Cool      
     
 -Thick No      
     
 -Discolored No      
     
 -Deformed No      
     
 -Improper Length & Hygeine No      
     
Communication Assessment       
     
Preferred language English      
     
 Required No      
     
Able to Read Yes      
     
Able to Write Yes      
     
Communication Tools None      
     
Caregiver Communication Skills No Impairment      
    
Impairment       
     
Right Hearing Abillity Normal      
     
Left Hearing Abillity Normal      
     
Visual Assistive Devices Glasses      
     
Teaching Assessment       
     
Preferences Verbal,Written,      
    
 Demonstration      
     
Barriers to Learning None      
     
Readiness To Learn Excellent      
     
Willingness to Engage in Self Management High      
    
Activies       
     
Readiness to Engage in Self Management High      
    
Activities       
     
Anxiety Level Calm      
     
Cooperation Cooperative      
     
Perception Coherent      
     
Interest in Health Problem Asks Questions      
     
Education Importance Acknowledges      
    
 Need      
     
Does Patient Smoke tobacco or other Yes      
    
substances       
     
Smoking Status Former smoker      
     
Is Patient Diabetic No      
     
Functional Assessment       
     
Recent Decline in Ability to Perform Ambulation,      
    
 Toileting      
     
Culture/Mosque/       
     
Cultural/Mosque Needs that may affect No      
    
Treatment Plan       
     
Would you allow our hospital  to No      
    
meet you for the purpose of spiritual/       
    
emotional support?       
     
 to contact place of Buddhism No      
     
Teaching: Wound Center       
     
Compression Wraps & Stockings       
     
 -Person Taught  Patient     
     
 -Teaching Method  Discussion     
     
 -Response to teaching  Verbalize     
    
  understanding     
    
    
    
    
  24    
    
  10:29 14:04    
     
WC - Today's Visit Information      
     
Type of service Nurse-only Follow-up Visit    
    
 Visit (Physician/CNP    
    
  )    
     
Arrival Mode Wheelchair Ambulatory,    
    
  Walker    
     
Transfer Assistance None     
     
Accompanied by      
     
Patient Identification Verified (Name & Yes Yes    
    
)      
     
Patient Requires Transmission-Based No     
    
Precautions      
     
Height and Weight      
     
Height      
     
Weight      
     
Weight in Pounds      
     
Body Mass Index (BMI) 19.5 19.5    
     
BMI Classification Normal Normal    
     
BSA - Darrell      
     
Vital Signs      
     
Temperature (97.8 F-99.1 F) 97.7 F L     
     
Temperature Source Temporal     
     
Pulse Rate () 85 105 H    
     
Pulse Location Monitor Monitor    
     
Respiratory Rate (12-18) 18 16    
     
Respiratory rate source Observation Observation    
     
Oxygen Delivery Method  Room Air    
     
Blood Pressure (90//80) 143/71 H 180/77 H    
     
Blood Pressure Mean 95 111    
     
Source Monitor Monitor    
     
Position Semi-Fowlers Semi-Fowlers    
     
Blood Pressure Location Left Arm Left Arm    
     
History Since Last Visit- (Skip if this      
    
is Patient's initial visit)      
     
Have you changed medications since your No No    
    
last visit?      
     
Any new allergies or adverse reactions No No    
     
Had a fall/change in ADL's that may No No    
    
increase risk of falls      
     
Signs or symptoms of abuse and/or No No    
    
neglect since last visit      
     
Have you been in the hospital since your No No    
    
last visit?      
     
Has dressing in place as prescribed Yes Yes    
     
Has compression in place as prescribed Yes Yes    
     
Has offloadiing in place as prescribed No N/A    
     
Experienced any changes in pain level or No No    
    
management      
     
Left Footwear  Regular Shoe    
     
Right Footwear  Regular Shoe    
     
Pain Scale: 0-10 Numeric      
     
Is Patient Pain Free? Yes Yes    
     
Lower Extremity Assessment/ Foot      
    
Assessment/ Toe Nail Assessment      
     
Right      
     
 -Posterior Tibial Doppler      
     
 -Dorsalis Pedis Doppler      
     
 -Extremity Color      
     
 -Hair Growth on Legs      
     
 -Hair Growth on Toes      
     
 -Temperature of Extremity      
     
 -Capillary Refill      
     
 -Thick      
     
 -Discolored      
     
 -Deformed      
     
 -Improper Length & Hygeine      
     
Left      
     
 -Posterior Tibial Doppler      
     
 -Dorsalis Pedis Doppler      
     
 -Hair Growth on Legs      
     
 -Hair Growth on Toes      
     
 -Temperature of Extremity      
     
 -Thick      
     
 -Discolored      
     
 -Deformed      
     
 -Improper Length & Hygeine      
     
Communication Assessment      
     
Preferred language      
     
 Required      
     
Able to Read      
     
Able to Write      
     
Communication Tools      
     
Caregiver Communication Skills      
    
Impairment      
     
Right Hearing Abillity      
     
Left Hearing Abillity      
     
Visual Assistive Devices      
     
Teaching Assessment      
     
Preferences      
     
Barriers to Learning      
     
Readiness To Learn      
     
Willingness to Engage in Self Management      
    
Activies      
     
Readiness to Engage in Self Management      
    
Activities      
     
Anxiety Level      
     
Cooperation      
     
Perception      
     
Interest in Health Problem      
     
Education Importance      
     
Does Patient Smoke tobacco or other      
    
substances      
     
Smoking Status      
     
Is Patient Diabetic      
     
Functional Assessment      
     
Recent Decline in Ability to Perform      
     
Culture/Mosque/      
     
Cultural/Mosque Needs that may affect      
    
Treatment Plan      
     
Would you allow our [facility]  to      
    
meet you for the purpose of spiritual/      
    
emotional support?      
     
 to contact place of Buddhism      
     
Teaching: Wound Center      
     
Compression Wraps & Stockings      
     
 -Person Taught      
     
 -Teaching Method      
     
 -Response to teaching      
    
    
WC - Nurse 1 - General Ulcer Measurement              Start:  24 13:20    
Freq:                                                 Status: Active            
Protocol:                                                                       
    
    
Activity Type Activity Date Activity User E-sign Co-sign Detail    
    
Recorded Client Recorded Date Recorded By      
     
Document 24 13:20        
    
wound center 24 13:41 KW       
     
Document 24 11:58 RB       
    
wound center 24 12:00 RB       
     
Document 24 14:53 KW       
    
wound center 24 15:07 KW       
     
Document 24 10:29 RB       
    
wound 24 10:32 RB       
     
Document 24 14:04        
    
[id number] 24 14:11        
    
    
    
    
  24    
    
  13:20 11:58 14:53    
     
Wound Center Nurse 1       
     
#1 RT POST LE       
     
 -Current Size (cm) - Length 1.2  1.3    
     
 -Current Size (cm) - Width 0.7  0.4    
     
 -Current Size (cm) - Depth 0.1  0.1    
     
 -Total Square Cm 0.84  0.52    
     
 -Date of Last Picture (Recall this 24      
    
field)       
     
 -Tunneling  No     
     
 -Undermining/Tunneling  No     
     
 -Classification - Thickness  Partial     
    
  Thickness     
     
 -Exudate Amt Small Medium Small    
     
 -Exudate Type Serosanguineous Serosanguineous Serosanguineous    
     
 -Wound Margin Distinct, Distinct, Distinct,    
    
 Outline Outline Outline    
    
 Attached Attached Attached    
     
 -Granulation Amt Small (1-33%) Medium (34-66%) Large (%)    
     
 -Granulation Quality Red Pink Red    
     
 -Slough/Fibrin  Yes     
     
 -Necrosis Amt Large (%) Medium (34-66%)     
     
 -Necrotic Tissue Type Adherent Slough Adherent Slough     
     
 -Structure Exposed  N/A     
     
 -Texture (Natalie-wound Skin Appearance) Assessed Assessed Assessed    
     
 -Moisture (Natalie-wound Skin Appearance) Assessed Assessed Assessed    
     
 -Color (Natalie-wound Skin Appearance) Assessed, Assessed Assessed    
    
 Erythema      
     
 -Temperature (Natalie-wound Skin No Abnormality No Abnormality No Abnormality    
    
Appearance) (Pt Warm) (Pt Warm) (Pt Warm)    
     
 -Tenderness on Palpation (Natalie-wound No No No    
    
Skin Appearance)       
     
 -Ulcer Cleansing Rinsed/ Wound Cleanser Soap and Water    
    
 Irrigated with      
    
 Saline      
     
 -Foul Odor after Cleansing No No No    
     
 -Anesthetic Used 5% Lidocaine  5% Lidocaine    
    
 Gel  Gel    
     
Lower Limb Edema Present  Yes     
     
Right Calf (cm) 31.6 29.5 29    
     
Right Ankle (cm) 21.4 19.5 20    
     
Left Calf (cm) 30.6      
     
Left Ankle (cm) 20.2      
    
    
    
    
  24    
    
  10:29 14:04    
     
Wound Center Nurse 1      
     
#1 RT POST LE      
     
 -Current Size (cm) - Length  0.1    
     
 -Current Size (cm) - Width  0.1    
     
 -Current Size (cm) - Depth  0.1    
     
 -Total Square Cm  0.01    
     
 -Date of Last Picture (Recall this      
    
field)      
     
 -Tunneling      
     
 -Undermining/Tunneling      
     
 -Classification - Thickness      
     
 -Exudate Amt Small Small    
     
 -Exudate Type Serosanguineous Serosanguineous    
     
 -Wound Margin  Distinct,    
    
  Outline    
    
  Attached    
     
 -Granulation Amt  Large (%)    
     
 -Granulation Quality  Red    
     
 -Slough/Fibrin      
     
 -Necrosis Amt      
     
 -Necrotic Tissue Type      
     
 -Structure Exposed      
     
 -Texture (Natalie-wound Skin Appearance)  Assessed    
     
 -Moisture (Natalie-wound Skin Appearance)  Assessed    
     
 -Color (Natalie-wound Skin Appearance)  Assessed    
     
 -Temperature (Natalie-wound Skin  No Abnormality    
    
Appearance)  (Pt Warm)    
     
 -Tenderness on Palpation (Natalie-wound  No    
    
Skin Appearance)      
     
 -Ulcer Cleansing  Soap and Water    
     
 -Foul Odor after Cleansing  No    
     
 -Anesthetic Used  5% Lidocaine    
    
  Gel    
     
Lower Limb Edema Present Yes     
     
Right Calf (cm) 30 28.6    
     
Right Ankle (cm) 19.5 19.7    
     
Left Calf (cm)      
     
Left Ankle (cm)      
    
    
WC - Nurse 2 - General Ulcer CM Notes                 Start:  24 13:20    
Freq:                                                 Status: Active            
Protocol:                                                                       
    
    
Activity Type Activity Date Activity User E-sign Co-sign Detail    
    
Recorded Client Recorded Date Recorded By      
     
Document 24 14:18 DS       
    
[id number] 24 14:22 DS       
     
Document 24 15:32 DS       
    
1 24 15:33 DS       
     
Document 24 14:27 DS       
    
1 24 14:30 DS       
    
    
    
    
  24    
    
  14:18 15:32 14:27    
     
Wound Center Nurse 2       
     
#1 RT POST LE       
     
 -Time 14:15 15:30 14:27    
     
 -Correct Patient Yes Yes Yes    
     
 -Correct Side, Site, Position Yes Yes Yes    
     
 -Correct Procedure Yes Yes Yes    
     
 -Procedure Performed Yes Yes Yes    
     
 -Type of Procedure Debridement Debridement Debridement    
     
 -Clinical Debridement Subcutaneous Subcutaneous Subcutaneous    
     
 -Tissue Removed Subcutaneous Subcutaneous Subcutaneous    
     
 -Post Debridement (cm) - Length 1.4 1 0.6    
     
 -Post Debridement (cm) - Width 1.5 0.8 0.4    
     
 -Post Debridement (cm) - Depth 0.1 0.1 0.1    
     
 -Total Square (Post) (cm) 2.10 0.8 0.24    
     
 -Area of Debridement (cm) - Length 1.4 1.0 0.6    
     
 -Area of Debridement (cm) - Width 1.5 0.8 0.4    
     
 -Total Square (Area) (cm) 2.10 0.80 0.24    
     
 -Tunneling No No No    
     
 -Undermining/Tunneling No No No    
     
 -Circular Undermining No No No    
     
 -Wound/Ulcer Outcome Not Healed Not Healed Not Healed    
     
 -Ulcer Cleansing Rinsed/ Rinsed/ Rinsed/    
    
 Irrigated with Irrigated with Irrigated with    
    
 Saline Saline Saline    
     
 -Bleeding Controlled with Pressure Pressure Pressure    
     
 -Treatment Response Procedure Procedure Procedure    
    
 Tolerated Well Tolerated Well Tolerated Well    
     
 -Debridement - Subq, 1st 20sq cm Yes Yes Yes    
     
Pain Scale: 0-10 Numeric       
     
Is Patient Pain Free? Yes Yes Yes    
    
    
WC - Nurse 3 - General Ulcer D/C NN                   Start:  24 13:20    
Freq:                                                 Status: Active            
Protocol:                                                                       
    
    
Activity Type Activity Date Activity User E-sign Co-sign Detail    
    
Recorded Client Recorded Date Recorded By      
     
Document 24 11:58 RB       
    
wound center 24 12:00 RB       
     
Document 24 15:43 KW       
    
wound center 24 15:44 KW       
     
Document 24 10:29 RB       
    
wound 24 10:32 RB       
     
Document 24 14:58 RB       
    
[id number] 24 14:58 RB       
    
    
    
    
  24    
    
  11:58 15:43 10:29    
     
Vital Signs       
     
Temperature (97.8 F-99.1 F) 97.7 F L  97.7 F L    
     
Temperature Source Temporal  Temporal    
     
Pulse Rate () 73  85    
     
Pulse Location Monitor  Monitor    
     
Respiratory Rate (12-18) 18  18    
     
Respiratory rate source Observation  Observation    
     
Blood Pressure (90//80) 150/69 H  143/71 H    
     
Blood Pressure Mean 96  95    
     
Source Monitor  Monitor    
     
Position Semi-Fowlers  Semi-Fowlers    
     
Blood Pressure Location Left Arm  Left Arm    
     
Pain Scale: 0-10 Numeric       
     
Is Patient Pain Free? Yes Yes Yes    
     
Teaching: Wound Center       
     
Compression Wraps & Stockings       
     
 -Person Taught Patient      
     
 -Teaching Method Discussion      
     
 -Response to teaching Verbalize      
    
 understanding      
     
Wound Care Center Nurse 3       
     
#1 RT POST LE       
     
 -Ulcer Cleansing  Rinsed/ Wound Cleanser    
    
  Irrigated with     
    
  Saline     
     
 -Primary Dressing Applied Promogran Promogran Promogran    
    
 Miryam Matter Miryam Matter Miryam Matter    
     
 -Primary Dressing Covered/Secured with Dry Gauze Dry Gauze & Dry Gauze    
    
  Roll Gauze     
     
 -Promogran Miryam Matter 1 1 1    
     
Right       
     
 -Multi-Layered Wrap Application Multi-Layer Multi-Layer Multi-Layer    
    
 Comp - Right ($ Comp - Right ($ Comp - Right ($    
    
 ) ) )    
     
Treatment Response Procedure  Procedure    
    
 Tolerated Well  Tolerated Well    
     
WC - Visit Discharge       
     
Discharge Condition Stable Stable Stable    
     
Ambulatory Status Ambulatory, Ambulatory, Wheelchair    
    
 Walker Walker     
     
Transportation Private Auto Private Auto NH transport    
     
Medication Reconcilliation completed & No No No    
    
provided to patient/care provider       
     
Clinical Summary of Care Provided Yes Yes No    
    
    
    
    
  24    
    
  14:58    
     
Vital Signs     
     
Temperature (97.8 F-99.1 F)     
     
Temperature Source     
     
Pulse Rate ()     
     
Pulse Location     
     
Respiratory Rate (12-18)     
     
Respiratory rate source     
     
Blood Pressure (90//80)     
     
Blood Pressure Mean     
     
Source     
     
Position     
     
Blood Pressure Location     
     
Pain Scale: 0-10 Numeric     
     
Is Patient Pain Free? Yes    
     
Teaching: Wound Center     
     
Compression Wraps & Stockings     
     
 -Person Taught     
     
 -Teaching Method     
     
 -Response to teaching     
     
Wound Care Center Nurse 3     
     
#1 RT POST LE     
     
 -Ulcer Cleansing Wound Cleanser    
     
 -Primary Dressing Applied Promogran    
    
 Miryam Matter    
     
 -Primary Dressing Covered/Secured with Dry Gauze    
     
 -Promogran Miryam Matter 1    
     
Right     
     
 -Multi-Layered Wrap Application Multi-Layer    
    
 Comp - Right ($    
    
 )    
     
Treatment Response Procedure    
    
 Tolerated Well    
     
WC - Visit Discharge     
     
Discharge Condition Stable    
     
Ambulatory Status Ambulatory,    
    
 Walker    
     
Transportation Private Auto    
     
Medication Reconcilliation completed & No    
    
provided to patient/care provider     
     
Clinical Summary of Care Provided Yes    
    
    
    
    
    
Assessment/Plan    
Assessment/Plan    
(1) Non-pressure chronic ulcer of calf:     
CODE(S):       L97.209 - Non-pressure chronic ulcer of unspecified calf with   
unspecified severity    
QUALIFIERS:               Laterality: right  Non-pressure ulcer stage: with fat   
layer exposed  Qualified Code(s): L97.212 - Non-pressure chronic ulcer of right   
calf with fat layer exposed    
(2) Non-pressure chronic ulcer of lower leg:     
CODE(S):       L97.909 - Non-pressure chronic ulcer of unspecified part of   
unspecified lower leg with unspecified severity    
QUALIFIERS:               Laterality: right  Non-pressure ulcer stage: with fat   
layer exposed  Qualified Code(s): L97.912 - Non-pressure chronic ulcer of   
unspecified part of right lower leg with fat layer exposed    
(3) Wound infection:     
CODE(S):       T14.8XXA - Other injury of unspecified body region, initial   
encounter; L08.9 - Local infection of the skin and subcutaneous tissue,   
unspecified    
(4) Leg swelling:     
CODE(S):       M79.89 - Other specified soft tissue disorders    
(5) Leg edema:     
CODE(S):       R60.0 - Localized edema    
(6) Bipolar disorder:     
CODE(S):       F31.9 - Bipolar disorder, unspecified    
(7) Depression:     
CODE(S):       F32.A - Depression, unspecified    
(8) Dementia:     
CODE(S):       F03.90 - Unspecified dementia, unspecified severity, without   
behavioral disturbance, psychotic disturbance, mood disturbance, and anxiety    
(9) Urinary incontinence:     
CODE(S):       R32 - Unspecified urinary incontinence    
(10) Hypertension:     
CODE(S):       I10 - Essential (primary) hypertension    
(11) Hypothyroidism:     
CODE(S):       E03.9 - Hypothyroidism, unspecified    
(12) History of hip surgery:     
CODE(S):       Z98.890 - Other specified postprocedural states    
PLAN:     
Plan    
This is a 73-year-old female who presented with an ulceration on the right   
posterior calf, age-indeterminate.  The patient is a poor historian.  She   
suffers from dementia, bipolar disorder, and depression.  The etiology of the   
patient's ulceration is uncertain.  The ulceration appears generally healthy and  
well-vascularized.  There is no sign of infection or cellulitis at this time.    
The ulceration has decreased in size since the patient's last visit.  We are to   
continue the use of moistened Miryam topically.  The patient's right lower   
extremity is to be wrapped with a 3M 2 layer compression wrap, which will stay   
in place undisturbed for several days.  This is intended to prevent the patient   
from  picking  or disturbing the ulcer site.  The 2 layer compression wrap is to  
be changed twice weekly.  The patient is to return in 1 week for reassessment.    
    
Total time: 22 minutes 108

## 2024-12-04 NOTE — HP.PCM_ITS
History of Present Illness    
Date of Service: 24    
Chief Complaint: Ulceration of the right posterior calf    
History of Wound: This is a 73-year-old female who is a resident of [facility].  She suffers from bipolar disorder, depression, and   
dementia.  She is a poor historian.  She presented with an ulceration on the   
right posterior calf, age indeterminate.  The patient is known to be a   .  
 She has been advised in the past to wear compression stockings, but she refuse  
s, and is not compliant.  She has recently been prescribed cefadroxil 500 mg   
p.o. twice daily, for a 10-day course.  This was prescribed as a result of   
cultures of her ulceration which were performed on May 24, 2024, which revealed   
the presence of Staph aureus.  It is said that the patient suffers from swelling  
and edema in her lower extremities.  She sleeps on a flat mattress at night.    
However, she is not very active, and spends a good deal of each day sitting.    
She has a history of hypertension and hypothyroidism.  She otherwise denies a   
history of myocardial infarction, cerebrovascular accident, diabetes mellitus,   
renal disease, pulmonary disease, and hyperlipidemia.  The patient is of   
relatively normal body habitus, with a BMI of 19.5.     
     
    
    
[facility]    
Medical History (Reviewed 24 @ 15:47 by Dr. Evangelista Carvajal MD)    
    
Non-pressure chronic ulcer of calf    
Wound infection    
Leg edema    
Leg swelling    
Hypothyroidism    
Hypertension    
Urinary incontinence    
Dementia    
Depression    
Bipolar disorder    
Non-pressure chronic ulcer of lower leg    
    
    
                                Home Medications    
    
    
    
?Medication ?Instructions ?Recorded ?Last Taken ?Type    
     
Depakote 625 cap PO QHS 24 Unknown History    
     
aspirin 81 mg capsule 81 mg PO DAILY 24 Unknown History    
     
cholecalciferol (vitamin D3) 10 25 mcg PO DAILY 24 Unknown History    
    
mcg/mL (400 unit/mL) oral drops        
    
(Pedia D-Konstantin)        
     
donepezil 10 mg tablet 5 mg PO DAILY 24 Unknown History    
     
donepezil 5 mg tablet (Aricept) 5 mg PO QHS 24 Unknown History    
     
hydroxyzine HCl 25 mg tablet 25 mg PO Q8H PRN anxiety 24 Unknown History    
     
levothyroxine 50 mcg tablet 50 mcg PO DAILY 24 Unknown History    
     
loperamide 2 mg capsule 2 mg PO Q6H PRN loose stool 24 Unknown History    
     
melatonin 3 mg capsule 3 mg PO QHS 24 Unknown History    
     
mirtazapine 30 mg tablet 45 mg PO QHS 24 Unknown History    
     
mirtazapine 45 mg tablet 45 mg PO QHS 24 Unknown History    
    
    
    
                                            
    
    
    
Allergy/AdvReac Type Severity Reaction Status Date / Time    
     
carbamazepine Allergy Intermediate PT UNSURE Verified 24 13:16    
    
   OF REACTION      
    
    
    
Surgical History (Reviewed 24 @ 15:47 by Dr. Evangelista Carvajal MD)    
    
History of hip surgery    
    
    
Social History (Reviewed 24 @ 15:47 by Dr. Evangelista Carvajal MD)    
Smoking Status:  Former smoker     
    
    
    
Vital Signs    
Vital Signs    
Vital Signs:     
                                            
    
    
    
 24    
14:30    
     
Temperature 98.1 F    
     
Temperature Source Temporal    
     
Pulse Rate 70    
     
Respiratory Rate 16    
     
Blood Pressure 166/64 H    
     
Blood Pressure Mean 98    
     
Blood Pressure Source Monitor    
     
Blood Pressure Position Sitting    
     
Blood Pressure Location Left Arm    
     
Oxygen Delivery Method Room Air    
    
    
                                     Weight    
    
    
    
Weight:                        107 lb                                             
    
     
Body Mass Index (BMI)          19.5                                               
    
    
    
    
    
    
Physical Exam    
Const    
alert, no apparent distress, average body habitus and well nourished    
Constitutional Narrative:     
The patient is alert and interactive, though confused.  The patient is of   
relatively normal body habitus, with a BMI of 19.5.    
General Appearance: cooperative, comfortable, well kempt and well developed    
Orientation / Consciousness: awake, confused and disoriented    
HEENT    
normocephalic and head/scalp atraumatic    
Head and Scalp: normal to inspection, normocephalic and atraumatic    
Face and Sinus: normal facial exam    
External Ear: external ears normal    
Eyes    
EOMs intact bilaterally    
General Eye: normal appearance of both eyes    
Resp    
normal respiratory effort, normal air movement, no retractions and no use of   
accessory muscles    
Effort and Inspection: able to speak in complete sentences    
Extremity    
no calf tenderness    
General Extremity: Negative for clubbing or cyanosis    
Skin    
Wound Narrative:     
An ulceration is noted on the patient's right posterior calf.  It is pink and   
healthy in appearance, with no sign of infection or cellulitis.  There is a   
small amount of bioburden.  Dimensions are documented elsewhere.  The ulceration  
continues to decrease in size.  There are now several small superficial   
excoriations in the right gaiter area, associated with some scaly, dermatitic   
changes.  There is no significant swelling or edema noted in the patient's lower  
extremities at this time.      
Neuro    
CN's II-XII intact bilaterally, moves all extremities and no focal motor   
deficits    
Sensorium / Orientation: awake, alert, orientation impaired and confused    
Psych    
Appearance: grossly normal and appropriate    
Attitude: calm    
Activity / Motor Behavior: appropriate eye contact    
Speech: normal speech    
Mood & Affect: euthymic mood    
Thought Process: normal thought process    
Thought Content: normal thought content    
Attention / Concentration: attention grossly intact    
    
Debridement Note    
Debridement Note    
Wound debrided: Right posterior calf    
Laterality: Right    
Type of Debridement: Excisional debridement    
Anesthesia Used: 5% Lidocaine Gel    
Depth: Down to and including healthy tissue and in the subcutaneous layer    
Percentage of wound debrided: 100    
Instrument Used: 3mm curette    
Tissue Removed: Bioburden and senescent material    
Severity: Fat Layer Exposed    
Amount of bleeding with debridement: Mild    
Bleeding Controlled with: Compression and gauze    
Patient tolerated procedure: Patient tolerated procedure well    
Post-Debridement Measurements and Additional Note:     
Post-Debridement Measurements/Treatment    
    
 - Nurse 1 - General Ulcer Assessment               Start:  24 14:30    
Freq:                                                 Status: Active            
Protocol:  .LOWEXT                                                            
    
    
Activity Type Activity Date Activity User E-sign Co-sign Detail    
    
Recorded Client Recorded Date Recorded By      
     
Document 24 14:30 KW       
    
z 24 14:36 KW       
    
    
    
    
  24    
    
  14:30    
     
 - Today's Visit Information     
     
Type of service Follow-up Visit    
    
 (Physician/CNP    
    
 )    
     
Arrival Mode Ambulatory,    
    
 Walker    
     
Transfer Assistance None    
     
Accompanied by caregiver    
     
Patient Identification Verified (Name & Yes    
    
)     
     
Patient Requires Transmission-Based No    
    
Precautions     
     
Height and Weight     
     
Body Mass Index (BMI) 19.5    
     
BMI Classification Normal    
     
Vital Signs     
     
Temperature (97.8 F-99.1 F) 98.1 F    
     
Temperature Source Temporal    
     
Pulse Rate () 70    
     
Pulse Location Monitor    
     
Respiratory Rate (12-18) 16    
     
Respiratory rate source Observation    
     
Oxygen Delivery Method Room Air    
     
Blood Pressure (90//80) 166/64 H    
     
Blood Pressure Mean 98    
     
Source Monitor    
     
Position Sitting    
     
Blood Pressure Location Left Arm    
     
History Since Last Visit- (Skip if this     
    
is Patient's initial visit)     
     
Have you changed medications since your No    
    
last visit?     
     
Any new allergies or adverse reactions No    
     
Had a fall/change in ADL's that may No    
    
increase risk of falls     
     
Signs or symptoms of abuse and/or No    
    
neglect since last visit     
     
Have you been in the hospital since your No    
    
last visit?     
     
Has dressing in place as prescribed Yes    
     
Has compression in place as prescribed No    
     
Has offloadiing in place as prescribed N/A    
     
Experienced any changes in pain level or No    
    
management     
     
Left Footwear Regular Shoe    
     
Right Footwear Regular Shoe    
     
Pain Scale: 0-10 Numeric     
     
Is Patient Pain Free? Yes    
    
    
WC - Nurse 1 - General Ulcer Measurement              Start:  24 14:30    
Freq:                                                 Status: Active            
Protocol:                                                                       
    
    
Activity Type Activity Date Activity User E-sign Co-sign Detail    
    
Recorded Client Recorded Date Recorded By      
     
Document 24 14:30 KW       
    
z 24 14:36 KW       
    
    
    
    
  24    
    
  14:30    
     
Wound Center Nurse 1     
     
#2- R ANTERIOR LE CLUSTER     
     
 -Combined with other wound No    
     
 -Current Size (cm) - Length 8.2    
     
 -Current Size (cm) - Width 5.5    
     
 -Current Size (cm) - Depth 0.1    
     
 -Total Square Cm 45.10    
     
 -Date of Last Picture (Recall this 24    
    
field)     
     
 -Photo Taken Yes    
     
 -Epithelialization None Present    
     
 -Tunneling No    
     
 -Undermining/Tunneling No    
     
 -Circular Undermining No    
     
 -Exudate Amt Medium    
     
 -Exudate Type Serosanguineous    
     
 -Wound Margin Distinct,    
    
 Outline    
    
 Attached    
     
 -Granulation Amt Large (%)    
     
 -Granulation Quality Red    
     
 -Slough/Fibrin No    
     
 -Necrosis Amt None Present (0    
    
 %)    
     
 -Texture (Natalie-wound Skin Appearance) Assessed    
     
 -Moisture (Natalie-wound Skin Appearance) Assessed    
     
 -Color (Natalie-wound Skin Appearance) Assessed    
     
 -Temperature (Natalie-wound Skin No Abnormality    
    
Appearance) (Pt Warm)    
     
 -Tenderness on Palpation (Natalie-wound No    
    
Skin Appearance)     
     
 -Ulcer Cleansing Rinsed/    
    
 Irrigated with    
    
 Saline    
     
 -Foul Odor after Cleansing No    
     
 -Anesthetic Used 5% Lidocaine    
    
 Gel    
     
#1 RT POST LE     
     
 -Combined with other wound No    
     
 -Current Size (cm) - Length 0.3    
     
 -Current Size (cm) - Width 0.2    
     
 -Current Size (cm) - Depth 0.1    
     
 -Total Square Cm 0.06    
     
 -Date of Last Picture (Recall this 24    
    
field)     
     
 -Photo Taken Yes    
     
 -Epithelialization None Present    
     
 -Tunneling No    
     
 -Undermining/Tunneling No    
     
 -Circular Undermining No    
     
 -Exudate Amt None Present    
     
 -Wound Margin Distinct,    
    
 Outline    
    
 Attached    
     
 -Granulation Amt None Present (0    
    
 %)    
     
 -Slough/Fibrin Yes    
     
 -Necrosis Amt Large (%)    
     
 -Necrotic Tissue Type Eschar    
     
 -Texture (Natalie-wound Skin Appearance) Assessed,    
    
 Scarring    
     
 -Moisture (Natalie-wound Skin Appearance) Assessed,Dry/    
    
 Scaly    
     
 -Color (Natalie-wound Skin Appearance) Assessed    
     
 -Temperature (Natalie-wound Skin No Abnormality    
    
Appearance) (Pt Warm)    
     
 -Tenderness on Palpation (Natalie-wound No    
    
Skin Appearance)     
     
 -Ulcer Cleansing Rinsed/    
    
 Irrigated with    
    
 Saline    
     
 -Foul Odor after Cleansing No    
     
 -Anesthetic Used 5% Lidocaine    
    
 Gel    
     
 -Wound Comment(s) not wearing 3m    
    
 today    
     
Right Calf (cm) 29.8    
     
Right Ankle (cm) 20.2    
    
    
WC - Nurse 2 - General Ulcer CM Notes                 Start:  24 14:30    
Freq:                                                 Status: Active            
Protocol:                                                                       
    
    
Activity Type Activity Date Activity User E-sign Co-sign Detail    
    
Recorded Client Recorded Date Recorded By      
     
Document 24 14:58 JF       
    
[id number] 24 15:04 JF       
    
    
    
    
  24    
    
  14:58    
     
Wound Center Nurse 2     
     
#2- R ANTERIOR LE CLUSTER     
     
 -Correct Patient No    
     
 -Correct Side, Site, Position No    
     
 -Correct Procedure No    
     
 -Procedure Performed No    
     
 -Wound/Ulcer Outcome Not Healed    
     
#1 RT POST LE     
     
 -Time 14:59    
     
 -Correct Patient Yes    
     
 -Correct Side, Site, Position Yes    
     
 -Correct Procedure Yes    
     
 -Procedure Performed Yes    
     
 -Type of Procedure Debridement    
     
 -Clinical Debridement Subcutaneous    
     
 -Tissue Removed Subcutaneous    
     
 -Post Debridement (cm) - Length 0.5    
     
 -Post Debridement (cm) - Width 0.4    
     
 -Post Debridement (cm) - Depth 0.1    
     
 -Total Square (Post) (cm) 0.20    
     
 -Area of Debridement (cm) - Length 0.5    
     
 -Area of Debridement (cm) - Width 0.4    
     
 -Total Square (Area) (cm) 0.20    
     
 -Tunneling No    
     
 -Undermining/Tunneling No    
     
 -Circular Undermining No    
     
 -Wound/Ulcer Outcome Not Healed    
     
 -Ulcer Cleansing Rinsed/    
    
 Irrigated with    
    
 Saline    
     
 -Foul Odor after Cleansing No    
     
 -Bioengineered Tissue No    
     
 -Bleeding Controlled with Pressure    
     
 -Treatment Response Procedure    
    
 Tolerated Well    
     
 -Offloading No    
     
 -Debridement - Subq, 1st 20sq cm Yes    
     
Pain Scale: 0-10 Numeric     
     
Is Patient Pain Free? Yes    
    
    
WC - Nurse 3 - General Ulcer D/C NN                   Start:  24 14:30    
Freq:                                                 Status: Active            
Protocol:                                                                       
    
    
Activity Type Activity Date Activity User E-sign Co-sign Detail    
    
Recorded Client Recorded Date Recorded By      
     
Document 24 15:15 KW       
    
z 24 15:16 KW       
    
    
    
    
  24    
    
  15:15    
     
Wound Care Center Nurse 3     
     
#2- R ANTERIOR LE CLUSTER     
     
 -Primary Dressing Covered/Secured with Dry Gauze &    
    
 Roll Gauze,    
    
 Secured with    
    
 Tape    
     
#1 RT POST LE     
     
 -Other Dressing OWN DIANE    
     
 -Primary Dressing Covered/Secured with Dry Gauze &    
    
 Roll Gauze,    
    
 Secured with    
    
 Tape    
     
Right     
     
 -Multi-Layered Wrap Application Unna Boot -    
    
 Right ($)    
     
Pain Scale: 0-10 Numeric     
     
Is Patient Pain Free? Yes    
     
WC - Visit Discharge     
     
Discharge Condition Stable    
     
Ambulatory Status Ambulatory,    
    
 Walker    
     
Medication Reconcilliation completed & No    
    
provided to patient/care provider     
     
Clinical Summary of Care Provided Yes    
    
    
    
    
    
Charges/Coding    
Procedures Integumentary    
[id number]: [id number] May subq tissue 20 sq cm/<    
    
Assessment/Plan    
Assessment/Plan    
(1) Non-pressure chronic ulcer of calf:     
CODE(S):       L97.209 - Non-pressure chronic ulcer of unspecified calf with   
unspecified severity    
QUALIFIERS:               Laterality: right  Non-pressure ulcer stage: with fat   
layer exposed  Qualified Code(s): L97.212 - Non-pressure chronic ulcer of right   
calf with fat layer exposed    
(2) Non-pressure chronic ulcer of lower leg:     
CODE(S):       L97.909 - Non-pressure chronic ulcer of unspecified part of   
unspecified lower leg with unspecified severity    
QUALIFIERS:               Laterality: right  Non-pressure ulcer stage: with fat   
layer exposed  Qualified Code(s): L97.912 - Non-pressure chronic ulcer of   
unspecified part of right lower leg with fat layer exposed    
(3) Wound infection:     
CODE(S):       T14.8XXA - Other injury of unspecified body region, initial   
encounter; L08.9 - Local infection of the skin and subcutaneous tissue,   
unspecified    
(4) Leg swelling:     
CODE(S):       M79.89 - Other specified soft tissue disorders    
(5) Leg edema:     
CODE(S):       R60.0 - Localized edema    
(6) Bipolar disorder:     
CODE(S):       F31.9 - Bipolar disorder, unspecified    
(7) Depression:     
CODE(S):       F32.A - Depression, unspecified    
(8) Dementia:     
CODE(S):       F03.90 - Unspecified dementia, unspecified severity, without   
behavioral disturbance, psychotic disturbance, mood disturbance, and anxiety    
(9) Urinary incontinence:     
CODE(S):       R32 - Unspecified urinary incontinence    
(10) Hypertension:     
CODE(S):       I10 - Essential (primary) hypertension    
(11) Hypothyroidism:     
CODE(S):       E03.9 - Hypothyroidism, unspecified    
(12) History of hip surgery:     
CODE(S):       Z98.890 - Other specified postprocedural states    
PLAN:     
Plan    
This is a 73-year-old female who presented with an ulceration on the right   
posterior calf, age-indeterminate.  The patient is a poor historian.  She   
suffers from dementia, bipolar disorder, and depression.  The etiology of the   
patient's ulceration is uncertain.  The ulceration appears generally healthy and  
well-vascularized.  There is no sign of infection or cellulitis at this time.    
The ulceration has decreased in size since the patient's last visit.  In   
addition, several small, superficial excoriations are noted in the right gaiter   
area.  A scaly dermatitis is also noted in the right gaiter area, though no   
erythema or sign of cellulitis.  We are to continue the use of moistened Diane   
topically, which will be used on the patient's ulceration, and newly noted   
excoriations.  Adaptic will be applied topically as well.  The patient's right   
lower extremity is to be wrapped with an Unna boot, which will be left in place   
until the patient's next visit.  This is intended to prevent the patient from   
 picking  or disturbing the ulcer site.  It was noted that the patient's wrap   
was not in place upon her presentation today, and there is uncertainty as to how  
long the wrap had been removed.  Furthermore, there is suspicion that the   
excoriations noted today may be the result of the patient scratching or picking   
at the sites.  The patient is to return in 1 week for reassessment.    
    
Total time: 22 minutes Pt wanted to know if martina thinks she should get the rsv shot? Call pt back please

## 2025-02-27 ENCOUNTER — LAB REQUISITION (OUTPATIENT)
Dept: LAB | Facility: HOSPITAL | Age: 75
End: 2025-02-27
Payer: MEDICARE

## 2025-02-27 DIAGNOSIS — R05.9 COUGH, UNSPECIFIED: ICD-10-CM

## 2025-02-27 DIAGNOSIS — R09.81 NASAL CONGESTION: ICD-10-CM

## 2025-02-27 PROCEDURE — 87502 INFLUENZA DNA AMP PROBE: CPT | Mod: OUT,SAMLAB | Performed by: NURSE PRACTITIONER

## 2025-02-28 LAB
FLUAV RNA RESP QL NAA+PROBE: NOT DETECTED
FLUBV RNA RESP QL NAA+PROBE: NOT DETECTED